# Patient Record
Sex: FEMALE | Race: WHITE | Employment: UNEMPLOYED | URBAN - METROPOLITAN AREA
[De-identification: names, ages, dates, MRNs, and addresses within clinical notes are randomized per-mention and may not be internally consistent; named-entity substitution may affect disease eponyms.]

---

## 2018-09-04 ENCOUNTER — OFFICE VISIT (OUTPATIENT)
Dept: FAMILY MEDICINE CLINIC | Age: 63
End: 2018-09-04
Payer: COMMERCIAL

## 2018-09-04 VITALS
TEMPERATURE: 99.1 F | HEIGHT: 62 IN | HEART RATE: 77 BPM | WEIGHT: 138 LBS | BODY MASS INDEX: 25.4 KG/M2 | SYSTOLIC BLOOD PRESSURE: 122 MMHG | OXYGEN SATURATION: 97 % | RESPIRATION RATE: 16 BRPM | DIASTOLIC BLOOD PRESSURE: 82 MMHG
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
## 2018-09-04 PROCEDURE — 4004F PT TOBACCO SCREEN RCVD TLK: CPT | Performed by: PHYSICIAN ASSISTANT

## 2018-09-04 PROCEDURE — 90471 IMMUNIZATION ADMIN: CPT | Performed by: PHYSICIAN ASSISTANT

## 2018-09-04 PROCEDURE — G8419 CALC BMI OUT NRM PARAM NOF/U: HCPCS | Performed by: PHYSICIAN ASSISTANT

## 2018-09-04 PROCEDURE — 3017F COLORECTAL CA SCREEN DOC REV: CPT | Performed by: PHYSICIAN ASSISTANT

## 2018-09-04 PROCEDURE — 90715 TDAP VACCINE 7 YRS/> IM: CPT | Performed by: PHYSICIAN ASSISTANT

## 2018-09-04 PROCEDURE — G8427 DOCREV CUR MEDS BY ELIG CLIN: HCPCS | Performed by: PHYSICIAN ASSISTANT

## 2018-09-04 PROCEDURE — 99213 OFFICE O/P EST LOW 20 MIN: CPT | Performed by: PHYSICIAN ASSISTANT

## 2018-09-04 RX ORDER — FLUCONAZOLE 150 MG/1
150 TABLET ORAL ONCE
Qty: 1 TABLET | Refills: 0 | Status: SHIPPED | OUTPATIENT
Start: 2018-09-04 | End: 2018-09-04

## 2018-09-04 RX ORDER — CEPHALEXIN 500 MG/1
500 CAPSULE ORAL 4 TIMES DAILY
Qty: 40 CAPSULE | Refills: 0 | Status: SHIPPED | OUTPATIENT
Start: 2018-09-04 | End: 2018-09-14

## 2018-09-04 RX ORDER — AZITHROMYCIN 250 MG/1
TABLET, FILM COATED ORAL
Qty: 1 PACKET | Refills: 0 | Status: SHIPPED | OUTPATIENT
Start: 2018-09-04 | End: 2018-09-08

## 2018-09-04 NOTE — PROGRESS NOTES
interpreted by Radiologist unless otherwise noted. -------------------------------------Impression & Disposition Section-----------------------------------  Impression(s):  1. Cat scratch      Disposition:  Disposition: Discharge to home      Patient's tetanus shot was updated. I will place her on a Zithromax (cat scratch)and Keflex. I advised if she has any worsening symptoms she needs to be reevaluated immediately. She is in agreement with this plan. No coinciding abscess present today. Pt advised to f/u with PCP in 5-7 days for continued management and further care. ER if changes or worse. Pt advised to take all medications as directed.

## 2018-09-14 ENCOUNTER — OFFICE VISIT (OUTPATIENT)
Dept: FAMILY MEDICINE CLINIC | Age: 63
End: 2018-09-14
Payer: COMMERCIAL

## 2018-09-14 VITALS
WEIGHT: 124 LBS | HEIGHT: 62 IN | HEART RATE: 76 BPM | OXYGEN SATURATION: 96 % | BODY MASS INDEX: 22.82 KG/M2 | SYSTOLIC BLOOD PRESSURE: 188 MMHG | DIASTOLIC BLOOD PRESSURE: 92 MMHG | TEMPERATURE: 98.9 F

## 2018-09-14 DIAGNOSIS — I10 ESSENTIAL HYPERTENSION: ICD-10-CM

## 2018-09-14 PROCEDURE — 4004F PT TOBACCO SCREEN RCVD TLK: CPT | Performed by: STUDENT IN AN ORGANIZED HEALTH CARE EDUCATION/TRAINING PROGRAM

## 2018-09-14 PROCEDURE — G8427 DOCREV CUR MEDS BY ELIG CLIN: HCPCS | Performed by: STUDENT IN AN ORGANIZED HEALTH CARE EDUCATION/TRAINING PROGRAM

## 2018-09-14 PROCEDURE — 99202 OFFICE O/P NEW SF 15 MIN: CPT | Performed by: STUDENT IN AN ORGANIZED HEALTH CARE EDUCATION/TRAINING PROGRAM

## 2018-09-14 PROCEDURE — 3017F COLORECTAL CA SCREEN DOC REV: CPT | Performed by: STUDENT IN AN ORGANIZED HEALTH CARE EDUCATION/TRAINING PROGRAM

## 2018-09-14 PROCEDURE — G0444 DEPRESSION SCREEN ANNUAL: HCPCS | Performed by: STUDENT IN AN ORGANIZED HEALTH CARE EDUCATION/TRAINING PROGRAM

## 2018-09-14 PROCEDURE — G8420 CALC BMI NORM PARAMETERS: HCPCS | Performed by: STUDENT IN AN ORGANIZED HEALTH CARE EDUCATION/TRAINING PROGRAM

## 2018-09-14 PROCEDURE — 99213 OFFICE O/P EST LOW 20 MIN: CPT | Performed by: STUDENT IN AN ORGANIZED HEALTH CARE EDUCATION/TRAINING PROGRAM

## 2018-09-14 RX ORDER — ESOMEPRAZOLE MAGNESIUM 40 MG/1
40 FOR SUSPENSION ORAL DAILY
COMMUNITY
End: 2019-04-16 | Stop reason: ALTCHOICE

## 2018-09-14 RX ORDER — AZITHROMYCIN 250 MG/1
250 TABLET, FILM COATED ORAL DAILY
COMMUNITY
End: 2019-04-16 | Stop reason: ALTCHOICE

## 2018-09-14 RX ORDER — VENLAFAXINE 37.5 MG/1
37.5 TABLET ORAL 2 TIMES DAILY
COMMUNITY
End: 2019-04-16 | Stop reason: ALTCHOICE

## 2018-09-14 RX ORDER — NADOLOL 20 MG/1
20 TABLET ORAL NIGHTLY
COMMUNITY
End: 2019-04-16 | Stop reason: ALTCHOICE

## 2018-09-14 RX ORDER — MECLIZINE HYDROCHLORIDE 25 MG/1
25 TABLET ORAL 2 TIMES DAILY
COMMUNITY
End: 2019-04-16 | Stop reason: ALTCHOICE

## 2018-09-14 RX ORDER — HYDROCORTISONE ACETATE 25 MG/1
25 SUPPOSITORY RECTAL 2 TIMES DAILY
COMMUNITY
End: 2019-04-16 | Stop reason: ALTCHOICE

## 2018-09-14 RX ORDER — FLUOXETINE HYDROCHLORIDE 40 MG/1
40 CAPSULE ORAL DAILY
COMMUNITY
End: 2019-04-16 | Stop reason: ALTCHOICE

## 2018-09-14 ASSESSMENT — ENCOUNTER SYMPTOMS
BLURRED VISION: 0
COUGH: 1
NAUSEA: 1
WHEEZING: 1
ORTHOPNEA: 0
SPUTUM PRODUCTION: 1
HEARTBURN: 1
DIARRHEA: 1
VOMITING: 0
SHORTNESS OF BREATH: 1
HEMOPTYSIS: 0
CONSTIPATION: 1

## 2018-09-14 ASSESSMENT — PATIENT HEALTH QUESTIONNAIRE - PHQ9
8. MOVING OR SPEAKING SO SLOWLY THAT OTHER PEOPLE COULD HAVE NOTICED. OR THE OPPOSITE, BEING SO FIGETY OR RESTLESS THAT YOU HAVE BEEN MOVING AROUND A LOT MORE THAN USUAL: 2
SUM OF ALL RESPONSES TO PHQ QUESTIONS 1-9: 23
3. TROUBLE FALLING OR STAYING ASLEEP: 3
10. IF YOU CHECKED OFF ANY PROBLEMS, HOW DIFFICULT HAVE THESE PROBLEMS MADE IT FOR YOU TO DO YOUR WORK, TAKE CARE OF THINGS AT HOME, OR GET ALONG WITH OTHER PEOPLE: 2
9. THOUGHTS THAT YOU WOULD BE BETTER OFF DEAD, OR OF HURTING YOURSELF: 2
SUM OF ALL RESPONSES TO PHQ9 QUESTIONS 1 & 2: 6
4. FEELING TIRED OR HAVING LITTLE ENERGY: 3
1. LITTLE INTEREST OR PLEASURE IN DOING THINGS: 3
7. TROUBLE CONCENTRATING ON THINGS, SUCH AS READING THE NEWSPAPER OR WATCHING TELEVISION: 1
2. FEELING DOWN, DEPRESSED OR HOPELESS: 3
SUM OF ALL RESPONSES TO PHQ QUESTIONS 1-9: 23
6. FEELING BAD ABOUT YOURSELF - OR THAT YOU ARE A FAILURE OR HAVE LET YOURSELF OR YOUR FAMILY DOWN: 3
5. POOR APPETITE OR OVEREATING: 3

## 2018-09-14 NOTE — PROGRESS NOTES
Hypertension. Past Surgical history :    Past Surgical History:   Procedure Laterality Date    APPENDECTOMY      BUNIONECTOMY      COLON SURGERY      HAMMER TOE SURGERY      HAND SURGERY      HYSTERECTOMY         Family Medical History :   Family History   Problem Relation Age of Onset    Diabetes Mother     Heart Disease Mother     Heart Attack Mother     Diabetes Maternal Grandmother        Social History :   Social History     Social History    Marital status:      Spouse name: N/A    Number of children: N/A    Years of education: N/A     Occupational History    Not on file.      Social History Main Topics    Smoking status: Current Every Day Smoker     Packs/day: 0.50     Years: 30.00     Types: Cigarettes    Smokeless tobacco: Never Used    Alcohol use Yes      Comment: BV and coke:  1 bottle in a month    Drug use: Yes     Frequency: 7.0 times per week     Types: Marijuana    Sexual activity: Yes     Partners: Male     Other Topics Concern    Not on file     Social History Narrative    No narrative on file        Current Medications    Current Outpatient Prescriptions   Medication Sig Dispense Refill    meclizine (ANTIVERT) 25 MG tablet Take 25 mg by mouth 2 times daily      nadolol (CORGARD) 20 MG tablet Take 20 mg by mouth nightly      venlafaxine (EFFEXOR) 37.5 MG tablet Take 37.5 mg by mouth 2 times daily      FLUoxetine (PROZAC) 40 MG capsule Take 40 mg by mouth daily      esomeprazole Magnesium (NEXIUM) 40 MG PACK Take 40 mg by mouth daily      calcium carbonate-vitamin D3 (RA CALCIUM 600/VITAMIN D-3) 600-400 MG-UNIT TABS Take 600 tablets by mouth 2 times daily      Lactulose (CONSTULOSE PO) Take 10 mg by mouth      hydrocortisone (ANUCORT-HC) 25 MG suppository Place 25 mg rectally 2 times daily      azithromycin (ZITHROMAX) 250 MG tablet Take 250 mg by mouth daily      PROAIR  (90 Base) MCG/ACT inhaler       RESTASIS MULTIDOSE 0.05 % ophthalmic emulsion  cephALEXin (KEFLEX) 500 MG capsule Take 1 capsule by mouth 4 times daily for 10 days 40 capsule 0    amlodipine (NORVASC) 5 MG tablet Take 10 mg by mouth daily       valsartan (DIOVAN) 80 MG tablet Take 80 mg by mouth daily.  cyclobenzaprine (FLEXERIL) 10 MG tablet Take 10 mg by mouth 3 times daily as needed.  tramadol (ULTRAM) 50 MG tablet Take  by mouth every 6 hours as needed. Pt unsure of dosage        No current facility-administered medications for this visit. Allergies : No Known Allergies    Review of Systems :    Review of Systems   Eyes: Negative for blurred vision. Respiratory: Positive for cough, sputum production, shortness of breath and wheezing. Negative for hemoptysis. Cardiovascular: Positive for palpitations, claudication, leg swelling and PND. Negative for chest pain and orthopnea. Gastrointestinal: Positive for constipation, diarrhea, heartburn, melena and nausea. Negative for vomiting. Psychiatric/Behavioral: Positive for depression. Physical Exam :    VITAL SIGNS :   Vitals:    09/14/18 1000 09/14/18 1021   BP: (!) 170/84 (!) 188/92   Pulse: 76    Temp: 98.9 °F (37.2 °C)    TempSrc: Oral    SpO2: 96%    Weight: 124 lb (56.2 kg)    Height: 5' 2\" (1.575 m)        GENERAL APPEARANCE : NAD, cooperative, appears stated age  EYES : PERRL, EOM's intact, anicteric sclerae and no pallor  HENT : AT/NC, oropharynx clear and without exudates  NECK : Supple, no thyromegaly, no lymphadenopathy  LUNGS : Respiration unlabored, vesicular breath sounds in BL peripheral lungs with no wheezes, rhonchi or rales  HEART : RRR, normal S1/S2, no murmur noted  ABDOMEN : Normoactive bowel sounds, soft, non-tender, no masses  EXTREMITIES : No peripheral edema, no clubbing, no cyanosis, peripheral pulses +2 in lower extremities  SKIN : Warm and dry, healing abrasion with scab present on right hand on the dorsum between second and first digit.  Multiple tattoo on both upper resolve       RTO in 2 weeks or earlier if any complaints. Future Appointments  Date Time Provider Ally Katy   9/28/2018 9:00 AM Glee Cogan, MD [facility]     ----------------------------------------------------------------  Signed by :  Glee Cogan, M.D., PGY-1  This case was discussed with Dr. Dominic Anderson

## 2018-09-28 ENCOUNTER — OFFICE VISIT (OUTPATIENT)
Dept: FAMILY MEDICINE CLINIC | Age: 63
End: 2018-09-28
Payer: COMMERCIAL

## 2018-09-28 VITALS
HEIGHT: 62 IN | TEMPERATURE: 98.5 F | HEART RATE: 70 BPM | DIASTOLIC BLOOD PRESSURE: 95 MMHG | OXYGEN SATURATION: 99 % | SYSTOLIC BLOOD PRESSURE: 165 MMHG | BODY MASS INDEX: 23 KG/M2 | RESPIRATION RATE: 20 BRPM | WEIGHT: 125 LBS

## 2018-09-28 DIAGNOSIS — M81.8 OTHER OSTEOPOROSIS WITHOUT CURRENT PATHOLOGICAL FRACTURE: ICD-10-CM

## 2018-09-28 DIAGNOSIS — Z13.820 SCREENING FOR OSTEOPOROSIS: Primary | ICD-10-CM

## 2018-09-28 DIAGNOSIS — Z12.31 BREAST CANCER SCREENING BY MAMMOGRAM: ICD-10-CM

## 2018-09-28 PROCEDURE — G8427 DOCREV CUR MEDS BY ELIG CLIN: HCPCS | Performed by: STUDENT IN AN ORGANIZED HEALTH CARE EDUCATION/TRAINING PROGRAM

## 2018-09-28 PROCEDURE — G0009 ADMIN PNEUMOCOCCAL VACCINE: HCPCS

## 2018-09-28 PROCEDURE — 4004F PT TOBACCO SCREEN RCVD TLK: CPT | Performed by: STUDENT IN AN ORGANIZED HEALTH CARE EDUCATION/TRAINING PROGRAM

## 2018-09-28 PROCEDURE — G8420 CALC BMI NORM PARAMETERS: HCPCS | Performed by: STUDENT IN AN ORGANIZED HEALTH CARE EDUCATION/TRAINING PROGRAM

## 2018-09-28 PROCEDURE — 90732 PPSV23 VACC 2 YRS+ SUBQ/IM: CPT

## 2018-09-28 PROCEDURE — 99213 OFFICE O/P EST LOW 20 MIN: CPT | Performed by: STUDENT IN AN ORGANIZED HEALTH CARE EDUCATION/TRAINING PROGRAM

## 2018-09-28 PROCEDURE — 3017F COLORECTAL CA SCREEN DOC REV: CPT | Performed by: STUDENT IN AN ORGANIZED HEALTH CARE EDUCATION/TRAINING PROGRAM

## 2018-09-28 PROCEDURE — 90686 IIV4 VACC NO PRSV 0.5 ML IM: CPT

## 2018-09-28 PROCEDURE — G0008 ADMIN INFLUENZA VIRUS VAC: HCPCS

## 2018-09-28 PROCEDURE — 6360000002 HC RX W HCPCS

## 2018-09-28 PROCEDURE — 99212 OFFICE O/P EST SF 10 MIN: CPT | Performed by: STUDENT IN AN ORGANIZED HEALTH CARE EDUCATION/TRAINING PROGRAM

## 2018-09-28 ASSESSMENT — ENCOUNTER SYMPTOMS
SINUS PAIN: 1
SPUTUM PRODUCTION: 1
SORE THROAT: 0
HEMOPTYSIS: 0
COUGH: 1

## 2018-09-28 NOTE — PROGRESS NOTES
Vaccine Information Sheet, \"Influenza - Inactivated\"  given to Alisson Hughes, or parent/legal guardian of  Alisson Hughes and verbalized understanding. Patient responses:    Have you ever had a reaction to a flu vaccine? No  Are you able to eat eggs without adverse effects? Yes  Do you have any current illness? No  Have you ever had [place] Syndrome? No    Flu vaccine given per order. Please see immunization tab.

## 2018-10-05 ENCOUNTER — HOSPITAL ENCOUNTER (OUTPATIENT)
Dept: GENERAL RADIOLOGY | Age: 63
Discharge: HOME | End: 2018-10-07
Payer: COMMERCIAL

## 2018-10-05 DIAGNOSIS — Z13.820 SCREENING FOR OSTEOPOROSIS: ICD-10-CM

## 2018-10-05 DIAGNOSIS — Z12.31 BREAST CANCER SCREENING BY MAMMOGRAM: ICD-10-CM

## 2018-10-05 DIAGNOSIS — M81.8 OTHER OSTEOPOROSIS WITHOUT CURRENT PATHOLOGICAL FRACTURE: ICD-10-CM

## 2018-10-05 PROCEDURE — 77080 DXA BONE DENSITY AXIAL: CPT

## 2018-10-05 PROCEDURE — 77063 BREAST TOMOSYNTHESIS BI: CPT

## 2018-11-12 RX ORDER — VALSARTAN 80 MG/1
80 TABLET ORAL DAILY
Qty: 30 TABLET | Refills: 1 | Status: SHIPPED | OUTPATIENT
Start: 2018-11-12 | End: 2019-10-18 | Stop reason: SDUPTHER

## 2018-11-12 RX ORDER — AMLODIPINE BESYLATE 5 MG/1
10 TABLET ORAL DAILY
Qty: 60 TABLET | Refills: 1 | Status: SHIPPED | OUTPATIENT
Start: 2018-11-12 | End: 2019-04-16 | Stop reason: ALTCHOICE

## 2019-04-16 ENCOUNTER — APPOINTMENT (OUTPATIENT)
Dept: GENERAL RADIOLOGY | Age: 64
End: 2019-04-16
Payer: COMMERCIAL

## 2019-04-16 ENCOUNTER — HOSPITAL ENCOUNTER (OUTPATIENT)
Age: 64
Setting detail: OBSERVATION
Discharge: HOME | End: 2019-04-17
Attending: EMERGENCY MEDICINE | Admitting: FAMILY MEDICINE
Payer: COMMERCIAL

## 2019-04-16 ENCOUNTER — OFFICE VISIT (OUTPATIENT)
Dept: FAMILY MEDICINE CLINIC | Age: 64
End: 2019-04-16
Payer: COMMERCIAL

## 2019-04-16 VITALS
HEART RATE: 80 BPM | RESPIRATION RATE: 18 BRPM | BODY MASS INDEX: 22.82 KG/M2 | OXYGEN SATURATION: 93 % | WEIGHT: 124 LBS | TEMPERATURE: 98.2 F | HEIGHT: 62 IN | DIASTOLIC BLOOD PRESSURE: 84 MMHG | SYSTOLIC BLOOD PRESSURE: 129 MMHG

## 2019-04-16 DIAGNOSIS — R07.9 CHEST PAIN, UNSPECIFIED TYPE: Primary | ICD-10-CM

## 2019-04-16 PROBLEM — F31.9 BIPOLAR 1 DISORDER (HCC): Status: ACTIVE | Noted: 2019-04-16

## 2019-04-16 PROBLEM — K21.9 GERD (GASTROESOPHAGEAL REFLUX DISEASE): Status: ACTIVE | Noted: 2019-04-16

## 2019-04-16 LAB
ANION GAP SERPL CALCULATED.3IONS-SCNC: 16 MMOL/L (ref 7–16)
BASOPHILS ABSOLUTE: 0.05 E9/L (ref 0–0.2)
BASOPHILS RELATIVE PERCENT: 0.5 % (ref 0–2)
BUN BLDV-MCNC: 12 MG/DL (ref 8–23)
CALCIUM SERPL-MCNC: 9.1 MG/DL (ref 8.6–10.2)
CHLORIDE BLD-SCNC: 105 MMOL/L (ref 98–107)
CO2: 23 MMOL/L (ref 22–29)
CREAT SERPL-MCNC: 0.8 MG/DL (ref 0.5–1)
EOSINOPHILS ABSOLUTE: 0.08 E9/L (ref 0.05–0.5)
EOSINOPHILS RELATIVE PERCENT: 0.9 % (ref 0–6)
GFR AFRICAN AMERICAN: >60
GFR NON-AFRICAN AMERICAN: >60 ML/MIN/1.73
GLUCOSE BLD-MCNC: 103 MG/DL (ref 74–99)
HCT VFR BLD CALC: 40.8 % (ref 34–48)
HEMOGLOBIN: 14 G/DL (ref 11.5–15.5)
IMMATURE GRANULOCYTES #: 0.02 E9/L
IMMATURE GRANULOCYTES %: 0.2 % (ref 0–5)
LYMPHOCYTES ABSOLUTE: 2.6 E9/L (ref 1.5–4)
LYMPHOCYTES RELATIVE PERCENT: 28.3 % (ref 20–42)
MCH RBC QN AUTO: 30.4 PG (ref 26–35)
MCHC RBC AUTO-ENTMCNC: 34.3 % (ref 32–34.5)
MCV RBC AUTO: 88.7 FL (ref 80–99.9)
MONOCYTES ABSOLUTE: 0.59 E9/L (ref 0.1–0.95)
MONOCYTES RELATIVE PERCENT: 6.4 % (ref 2–12)
NEUTROPHILS ABSOLUTE: 5.86 E9/L (ref 1.8–7.3)
NEUTROPHILS RELATIVE PERCENT: 63.7 % (ref 43–80)
PDW BLD-RTO: 12 FL (ref 11.5–15)
PLATELET # BLD: 339 E9/L (ref 130–450)
PMV BLD AUTO: 9.4 FL (ref 7–12)
POTASSIUM SERPL-SCNC: 3.4 MMOL/L (ref 3.5–5)
PRO-BNP: 239 PG/ML (ref 0–125)
PRO-BNP: 253 PG/ML (ref 0–125)
RBC # BLD: 4.6 E12/L (ref 3.5–5.5)
SODIUM BLD-SCNC: 144 MMOL/L (ref 132–146)
TROPONIN: <0.01 NG/ML (ref 0–0.03)
WBC # BLD: 9.2 E9/L (ref 4.5–11.5)

## 2019-04-16 PROCEDURE — 99285 EMERGENCY DEPT VISIT HI MDM: CPT

## 2019-04-16 PROCEDURE — 36415 COLL VENOUS BLD VENIPUNCTURE: CPT

## 2019-04-16 PROCEDURE — G0378 HOSPITAL OBSERVATION PER HR: HCPCS

## 2019-04-16 PROCEDURE — 93010 ELECTROCARDIOGRAM REPORT: CPT | Performed by: STUDENT IN AN ORGANIZED HEALTH CARE EDUCATION/TRAINING PROGRAM

## 2019-04-16 PROCEDURE — 84484 ASSAY OF TROPONIN QUANT: CPT

## 2019-04-16 PROCEDURE — 6370000000 HC RX 637 (ALT 250 FOR IP)

## 2019-04-16 PROCEDURE — 2580000003 HC RX 258: Performed by: STUDENT IN AN ORGANIZED HEALTH CARE EDUCATION/TRAINING PROGRAM

## 2019-04-16 PROCEDURE — 3017F COLORECTAL CA SCREEN DOC REV: CPT | Performed by: STUDENT IN AN ORGANIZED HEALTH CARE EDUCATION/TRAINING PROGRAM

## 2019-04-16 PROCEDURE — G8420 CALC BMI NORM PARAMETERS: HCPCS | Performed by: STUDENT IN AN ORGANIZED HEALTH CARE EDUCATION/TRAINING PROGRAM

## 2019-04-16 PROCEDURE — 6370000000 HC RX 637 (ALT 250 FOR IP): Performed by: STUDENT IN AN ORGANIZED HEALTH CARE EDUCATION/TRAINING PROGRAM

## 2019-04-16 PROCEDURE — G8427 DOCREV CUR MEDS BY ELIG CLIN: HCPCS | Performed by: STUDENT IN AN ORGANIZED HEALTH CARE EDUCATION/TRAINING PROGRAM

## 2019-04-16 PROCEDURE — 94761 N-INVAS EAR/PLS OXIMETRY MLT: CPT

## 2019-04-16 PROCEDURE — 6370000000 HC RX 637 (ALT 250 FOR IP): Performed by: EMERGENCY MEDICINE

## 2019-04-16 PROCEDURE — 71045 X-RAY EXAM CHEST 1 VIEW: CPT

## 2019-04-16 PROCEDURE — 93005 ELECTROCARDIOGRAM TRACING: CPT | Performed by: STUDENT IN AN ORGANIZED HEALTH CARE EDUCATION/TRAINING PROGRAM

## 2019-04-16 PROCEDURE — 4004F PT TOBACCO SCREEN RCVD TLK: CPT | Performed by: STUDENT IN AN ORGANIZED HEALTH CARE EDUCATION/TRAINING PROGRAM

## 2019-04-16 PROCEDURE — 83880 ASSAY OF NATRIURETIC PEPTIDE: CPT

## 2019-04-16 PROCEDURE — 80048 BASIC METABOLIC PNL TOTAL CA: CPT

## 2019-04-16 PROCEDURE — 85025 COMPLETE CBC W/AUTO DIFF WBC: CPT

## 2019-04-16 PROCEDURE — 99214 OFFICE O/P EST MOD 30 MIN: CPT | Performed by: STUDENT IN AN ORGANIZED HEALTH CARE EDUCATION/TRAINING PROGRAM

## 2019-04-16 PROCEDURE — 99213 OFFICE O/P EST LOW 20 MIN: CPT | Performed by: STUDENT IN AN ORGANIZED HEALTH CARE EDUCATION/TRAINING PROGRAM

## 2019-04-16 RX ORDER — ESCITALOPRAM OXALATE 10 MG/1
10 TABLET ORAL DAILY
COMMUNITY
End: 2019-10-18 | Stop reason: SDUPTHER

## 2019-04-16 RX ORDER — NITROGLYCERIN 0.4 MG/1
0.4 TABLET SUBLINGUAL EVERY 5 MIN PRN
Status: DISCONTINUED | OUTPATIENT
Start: 2019-04-16 | End: 2019-04-17 | Stop reason: HOSPADM

## 2019-04-16 RX ORDER — ASPIRIN 81 MG/1
162 TABLET, CHEWABLE ORAL ONCE
Status: COMPLETED | OUTPATIENT
Start: 2019-04-16 | End: 2019-04-16

## 2019-04-16 RX ORDER — ASPIRIN 81 MG/1
324 TABLET, CHEWABLE ORAL ONCE
Status: COMPLETED | OUTPATIENT
Start: 2019-04-16 | End: 2019-04-16

## 2019-04-16 RX ORDER — SODIUM CHLORIDE 0.9 % (FLUSH) 0.9 %
10 SYRINGE (ML) INJECTION EVERY 12 HOURS SCHEDULED
Status: DISCONTINUED | OUTPATIENT
Start: 2019-04-16 | End: 2019-04-17 | Stop reason: HOSPADM

## 2019-04-16 RX ORDER — SODIUM CHLORIDE 0.9 % (FLUSH) 0.9 %
10 SYRINGE (ML) INJECTION PRN
Status: DISCONTINUED | OUTPATIENT
Start: 2019-04-16 | End: 2019-04-17 | Stop reason: HOSPADM

## 2019-04-16 RX ORDER — PANTOPRAZOLE SODIUM 40 MG/1
40 TABLET, DELAYED RELEASE ORAL
Status: DISCONTINUED | OUTPATIENT
Start: 2019-04-17 | End: 2019-04-16 | Stop reason: SDUPTHER

## 2019-04-16 RX ORDER — AMLODIPINE BESYLATE 10 MG/1
10 TABLET ORAL DAILY
COMMUNITY
End: 2019-10-18 | Stop reason: SDUPTHER

## 2019-04-16 RX ORDER — ASPIRIN 81 MG/1
81 TABLET, CHEWABLE ORAL DAILY
Status: DISCONTINUED | OUTPATIENT
Start: 2019-04-17 | End: 2019-04-17 | Stop reason: HOSPADM

## 2019-04-16 RX ORDER — CYCLOBENZAPRINE HCL 10 MG
10 TABLET ORAL 3 TIMES DAILY PRN
Status: DISCONTINUED | OUTPATIENT
Start: 2019-04-16 | End: 2019-04-17 | Stop reason: HOSPADM

## 2019-04-16 RX ORDER — VALSARTAN 80 MG/1
80 TABLET ORAL DAILY
Status: DISCONTINUED | OUTPATIENT
Start: 2019-04-16 | End: 2019-04-17 | Stop reason: HOSPADM

## 2019-04-16 RX ORDER — AMLODIPINE BESYLATE 10 MG/1
10 TABLET ORAL DAILY
Status: DISCONTINUED | OUTPATIENT
Start: 2019-04-16 | End: 2019-04-17 | Stop reason: HOSPADM

## 2019-04-16 RX ORDER — ESOMEPRAZOLE MAGNESIUM 40 MG/1
40 FOR SUSPENSION ORAL DAILY
Status: DISCONTINUED | OUTPATIENT
Start: 2019-04-16 | End: 2019-04-16 | Stop reason: CLARIF

## 2019-04-16 RX ORDER — ONDANSETRON 2 MG/ML
4 INJECTION INTRAMUSCULAR; INTRAVENOUS EVERY 6 HOURS PRN
Status: DISCONTINUED | OUTPATIENT
Start: 2019-04-16 | End: 2019-04-17 | Stop reason: HOSPADM

## 2019-04-16 RX ORDER — PANTOPRAZOLE SODIUM 40 MG/1
40 TABLET, DELAYED RELEASE ORAL
Status: DISCONTINUED | OUTPATIENT
Start: 2019-04-17 | End: 2019-04-17 | Stop reason: HOSPADM

## 2019-04-16 RX ORDER — LANOLIN ALCOHOL/MO/W.PET/CERES
3 CREAM (GRAM) TOPICAL NIGHTLY PRN
COMMUNITY
End: 2019-06-27

## 2019-04-16 RX ORDER — ATORVASTATIN CALCIUM 10 MG/1
10 TABLET, FILM COATED ORAL NIGHTLY
Status: DISCONTINUED | OUTPATIENT
Start: 2019-04-16 | End: 2019-04-17

## 2019-04-16 RX ORDER — POTASSIUM CHLORIDE 20 MEQ/1
40 TABLET, EXTENDED RELEASE ORAL ONCE
Status: COMPLETED | OUTPATIENT
Start: 2019-04-16 | End: 2019-04-16

## 2019-04-16 RX ORDER — ESCITALOPRAM OXALATE 10 MG/1
10 TABLET ORAL DAILY
Status: DISCONTINUED | OUTPATIENT
Start: 2019-04-16 | End: 2019-04-17 | Stop reason: HOSPADM

## 2019-04-16 RX ADMIN — ASPIRIN 162 MG: 81 TABLET, CHEWABLE ORAL at 15:08

## 2019-04-16 RX ADMIN — ASPIRIN 81 MG 324 MG: 81 TABLET ORAL at 16:15

## 2019-04-16 RX ADMIN — ATORVASTATIN CALCIUM 10 MG: 10 TABLET, FILM COATED ORAL at 21:23

## 2019-04-16 RX ADMIN — POTASSIUM CHLORIDE 40 MEQ: 20 TABLET, EXTENDED RELEASE ORAL at 17:41

## 2019-04-16 RX ADMIN — Medication 10 ML: at 21:24

## 2019-04-16 ASSESSMENT — PAIN SCALES - GENERAL
PAINLEVEL_OUTOF10: 2
PAINLEVEL_OUTOF10: 0
PAINLEVEL_OUTOF10: 0

## 2019-04-16 ASSESSMENT — ENCOUNTER SYMPTOMS
VOICE CHANGE: 0
VOMITING: 0
ABDOMINAL DISTENTION: 0
WHEEZING: 0
SHORTNESS OF BREATH: 1
WHEEZING: 0
NAUSEA: 0
SHORTNESS OF BREATH: 1
CONSTIPATION: 0
PHOTOPHOBIA: 0
CHEST TIGHTNESS: 1
CONSTIPATION: 0
CHOKING: 0
EYE PAIN: 0
ABDOMINAL PAIN: 0
RHINORRHEA: 0
COUGH: 0
SORE THROAT: 0
APNEA: 0
RHINORRHEA: 0
COUGH: 1
TROUBLE SWALLOWING: 0
BLOOD IN STOOL: 0
CHEST TIGHTNESS: 1
NAUSEA: 0
BLOOD IN STOOL: 0
EYE REDNESS: 0
ABDOMINAL DISTENTION: 0
VOMITING: 0
EYE DISCHARGE: 0
SINUS PRESSURE: 0
BACK PAIN: 0
SORE THROAT: 0
ABDOMINAL PAIN: 0
BACK PAIN: 0
DIARRHEA: 0

## 2019-04-16 ASSESSMENT — PAIN DESCRIPTION - PAIN TYPE: TYPE: ACUTE PAIN

## 2019-04-16 ASSESSMENT — PAIN DESCRIPTION - DESCRIPTORS: DESCRIPTORS: SQUEEZING

## 2019-04-16 ASSESSMENT — PAIN DESCRIPTION - LOCATION: LOCATION: CHEST

## 2019-04-16 NOTE — PROGRESS NOTES
[facility]  FAMILY MEDICINE RESIDENCY PROGRAM   OFFICE PROGRESS NOTE  DATE OF VISIT : 2019    Patient : Joleen Jordan   Sex : femaleAge : 61 y.o.  : 1955   MRN : [id number]              Chief Complaint :   Chief Complaint   Patient presents with    Follow-Up from Hospital     heart surgery wants a referral patient signed herself out of the [facility] [address]  61 y.o. who presented to the clinic today for chest pressure in the middle of chest since last 3 weeks she and  has had intermittent left sided shoulder pain association with SOB and lightheadedness. She states her symptoms are present at rest and with exertion as well, states her shortness of breath is at baseline. Endorses heart burn and cough at baseline. Denies nausea, vomiting, diaphoresis, numbness, tingling, speech disturbance or imbalance. She smokes about a pack per day and her family history is significant for MI in her mother at the age of 62    She states she was admitted at [facility] the  for troponin level of 0.45 and was scheduled for a cardiac catheterization because she \"was not a candidate\" for stress. She left against medical advice as the catheterization was postponed over the weekend for the upcoming Monday 3/29/19. Since then her symptoms have been constant and increasing in severity progressively.        Past Medical History :  has a past medical history of Arthritis, Bipolar 1 disorder ([address] Cirrhosis of liver due to hepatitis C, Depression, and Hypertension.     Past Surgical history :    Past Surgical History:   Procedure Laterality Date    APPENDECTOMY      BUNIONECTOMY      COLON SURGERY      HAMMER TOE SURGERY      HAND SURGERY      HYSTERECTOMY         Family Medical History :   Family History   Problem Relation Age of Onset    Diabetes Mother     Heart Disease Mother     Heart Attack Mother     Diabetes Maternal Grandmother        Social History : Social History     Socioeconomic History    Marital status:      Spouse name: Not on file    Number of children: Not on file    Years of education: Not on file    Highest education level: Not on file   Occupational History    Not on file   Social Needs    Financial resource strain: Not on file    Food insecurity:     Worry: Not on file     Inability: Not on file    Transportation needs:     Medical: Not on file     Non-medical: Not on file   Tobacco Use    Smoking status: Current Every Day Smoker     Packs/day: 0.50     Years: 30.00     Pack years: 15.00     Types: Cigarettes    Smokeless tobacco: Never Used   Substance and Sexual Activity    Alcohol use: Not Currently     Comment: BV and coke:  1 bottle in a month    Drug use: Yes     Frequency: 7.0 times per week     Types: Marijuana    Sexual activity: Yes     Partners: Male   Lifestyle    Physical activity:     Days per week: Not on file     Minutes per session: Not on file    Stress: Not on file   Relationships    Social connections:     Talks on phone: Not on file     Gets together: Not on file     Attends [employer] service: Not on file     Active member of club or organization: Not on file     Attends meetings of clubs or organizations: Not on file     Relationship status: Not on file    Intimate partner violence:     Fear of current or ex partner: Not on file     Emotionally abused: Not on file     Physically abused: Not on file     Forced sexual activity: Not on file   Other Topics Concern    Not on file   Social History Narrative    Not on file        Current Medications    No current facility-administered medications for this visit. No current outpatient medications on file.      Facility-Administered Medications Ordered in Other Visits   Medication Dose Route Frequency Provider Last Rate Last Dose    nitroGLYCERIN (NITROSTAT) SL tablet 0.4 mg  0.4 mg Sublingual Q5 Min PRN Rosezetta Manan, DO        amLODIPine (NORVASC) tablet 10 mg  10 mg Oral Daily Lucero De Anda MD        cyclobenzaprine (FLEXERIL) tablet 10 mg  10 mg Oral TID PRN Lucero De Anda MD        valsartan (DIOVAN) tablet 80 mg  80 mg Oral Daily Lucero De Anda MD        escitalopram (LEXAPRO) tablet 10 mg  10 mg Oral Daily Lucero De Anda MD        sodium chloride flush 0.9 % injection 10 mL  10 mL Intravenous 2 times per day Lucero De Anda MD   10 mL at 04/16/19 2124    sodium chloride flush 0.9 % injection 10 mL  10 mL Intravenous PRN Lucero De Anda MD        magnesium hydroxide (MILK OF MAGNESIA) 400 MG/5ML suspension 30 mL  30 mL Oral Daily PRN Lucero De Anda MD        ondansetron [facility]) injection 4 mg  4 mg Intravenous Q6H PRN Lucero De Anda MD        enoxaparin (LOVENOX) injection 40 mg  40 mg Subcutaneous Daily Lucero De Anda MD        [START ON 4/17/2019] aspirin chewable tablet 81 mg  81 mg Oral Daily Lucero De Anda MD        atorvastatin (LIPITOR) tablet 10 mg  10 mg Oral Nightly Lucero De Anda MD   10 mg at 04/16/19 2123    [START ON 4/17/2019] metoprolol tartrate (LOPRESSOR) tablet 25 mg  25 mg Oral BID Lucero De Anda MD        [START ON 4/17/2019] pantoprazole (PROTONIX) tablet 40 mg  40 mg Oral QAM AC Lucero De Anda MD           Allergies : No Known Allergies    Review of Systems :    Review of Systems   Constitutional: Negative for activity change, appetite change, diaphoresis and fever. HENT: Negative for ear pain, rhinorrhea, sore throat and voice change. Eyes: Negative for discharge. Respiratory: Positive for cough, chest tightness and shortness of breath. Negative for apnea, choking and wheezing. Cardiovascular: Positive for chest pain. Negative for palpitations and leg swelling. Gastrointestinal: Negative for abdominal distention, abdominal pain, blood in stool, constipation, nausea and vomiting. Genitourinary: Negative for difficulty urinating and frequency. Musculoskeletal: Negative for arthralgias, back pain, gait problem and neck pain. Neurological: Positive for light-headedness. Psychiatric/Behavioral: Negative for self-injury. Physical Exam :    VITAL SIGNS :   Vitals:    04/16/19 1407   BP: 129/84   Site: Right Upper Arm   Position: Sitting   Cuff Size: Medium Adult   Pulse: 80   Resp: 18   Temp: 98.2 °F (36.8 °C)   TempSrc: Oral   SpO2: 93%   Weight: 124 lb (56.2 kg)   Height: 5' 2\" (1.575 m)       GENERAL APPEARANCE : Patient smell of smoke, sitting comfortably, does not appear to be in any stress, but anxious EYES : PERRL, EOM's intact, anictericsclerae  HENT : AT/NC, oropharynx clear and without exudates  NECK : Supple  LUNGS : Respiration unlabored, vesicular breath sounds in BL peripheral lungs with no wheezes, rhonchi or rales  HEART : RRR, normal S1/S2, no murmur noted, chest non tender to palpation   ABDOMEN : Normoactive bowel sounds,soft, non-tender, no masses  EXTREMITIES : No peripheral edema, peripheral pulses +2  SKIN : Warm and dry, no lesions orerythema  PSYCHIATRIC : Appropriate affect             Assessment & Plan :    Johnathan Ramirez was seen today for follow-up from hospital.    Diagnoses and all orders for this visit:    Chest pain/ Pressure, Atypical angina  Atypical symptoms   High risk for ACS  Age, postmenopausal, smoker, hypertension, COPD  Elevated troponin in March  EKG 12 Lead - changes concerning for NSTEMI, Q wave, Twave changes noted when compared with Old EKG  Aspirin chewable tablet 162 mg  Urgent transfer to ED with EMS, Proper communication done.     ----------------------------------------------------------------  Signed by :  Maite Ragsdale M.D., PGY-1  This case was discussed with Dr. Kenisha Yeager

## 2019-04-16 NOTE — H&P
[facility]  FAMILY MEDICINE RESIDENCY PROGRAM   INPATIENT HISTORY & PHYSICAL   DATE : 2019    Patient : Andreia Falk   Age : 61 y.o. Sex : female    : 1955   MRN : [id number]     Code Status : Prior   PCP : Cristina Collins MD         Chief Complaint :   Chief Complaint   Patient presents with    Chest Pain     2/10 squeezing pain received 81mg ASA PTA       History obtained from : patient    Present Illness : Andreia Falk comes to ED today for chest pain. Mrs. Magdi Thornton is 61year old female with a history of COPD non-compliant with inhalers, hepatitis C s/p treatment (history of IVDA), and Bipolar disorder. She was sent from clinic for reports of chest pain and abnormal EKG. Of note she states she was admitted at [facility] the  for troponin level of 0.45 and was scheduled for a cardiac catheterization because she \"was not a candidate\" for stress. She left against medical advice as the catheterization was postponed over the weekend for the upcoming Monday. She reports for the last three weeks she has had intermittent left sided substernal chest pain. Endorses radiation of the pain to her left shoulder and association with SOB and lightheadedness. Also endorsing visual changes and headaches for the last three weeks. Reports she is in need of change or her lens prescription. Endorses heart burn and cough. She is currently taking nexium which is not efficacious. Denies nausea, vomiting, diaphoresis, numbness, tingling, speech disturbance or imbalance. She smokes about a pack per day and her family history is significant for MI in her mother at the age of 62. ED Course : In the ED she remained hemodynamically stable and chest pain free. She was given sublingual nitro and aspirin 324. Thelaboratory data obtained by ED work up was significant for potassium of 3.4.       EKG: Rhythm Strip: normal sinus rhythm with T wave inversions in the lateral leads.    ED Actual orders :   Orders Placed This Encounter   Procedures    XR CHEST PORTABLE    CBC auto differential    Basic Metabolic Panel    Troponin    Brain Natriuretic Peptide    Pulse Oximetry    Telemetry monitoring    Inpatient consult to [facility]    EKG 12 Lead    Insert peripheral IV    PATIENT STATUS (FROM ED OR OR/PROCEDURAL) Observation       Past Medical History :  has a past medical history of Arthritis, Bipolar 1 disorder ([address] Cirrhosis of liver due to hepatitis C, Depression, and Hypertension. Past Surgical History :  has a past surgical history that includes Hysterectomy; Hand surgery; Bunionectomy; Hammer toe surgery; Appendectomy; and Colon surgery. Family History : family history includes Diabetes in her maternal grandmother and mother; Heart Attack in her mother; Heart Disease in her mother. Social History :  reports that she has been smoking cigarettes. She has a 15.00 pack-year smoking history. She has never used smokeless tobacco. She reports that she drank alcohol. She reports that she has current or past drug history. Drug: Marijuana. Frequency: 7.00 times per week. Allergies : No Known Allergies     Medications prior admission :   No current facility-administered medications on file prior to encounter.       Current Outpatient Medications on File Prior to Encounter   Medication Sig Dispense Refill    escitalopram (LEXAPRO) 10 MG tablet Take 10 mg by mouth daily      amLODIPine (NORVASC) 10 MG tablet Take 10 mg by mouth daily      melatonin 3 MG TABS tablet Take 3 mg by mouth nightly as needed      valsartan (DIOVAN) 80 MG tablet Take 1 tablet by mouth daily 30 tablet 1    PROAIR  (90 Base) MCG/ACT inhaler          SYSTEMS SYMPTOMS   Constitutional:  Eyes:  HENT:  Respiratory:  Cardiovascular:  Gastrointestinal:  Genitourinary:  Musculoskeletal:  Endocrine:  Hematology:  Neurological:  Psychiatric: No fever, no chills, no involuntary weight changes  + blurry vision, no visual changes, no photophobia  + headache, no hearing change, no vertigo  No cough, + shortness of breath, no wheezes  + chest pain, no palpitations, no edema  No N/V, no D/C, no abdominal pain, no bloody or black stools  No dysuria, no hematuria, no trouble voiding  No joint pain, no muscle pain  No heat/cold intolerance, no hair changes  No increased bleeding, no increased bruising  No tremors, no weakness, no  numbness/tingling  No depressed mood, no hallucinations       Physical Exam :    VITAL SIGNS :   Vitals:    04/16/19 1530 04/16/19 1616 04/16/19 1743 04/16/19 1744   BP: 138/85 (!) 150/85 (!) 143/81 (!) 143/81   Pulse: 71 76 73 73   Resp: 16 16 19 20   Temp: 97.4 °F (36.3 °C)  97.4 °F (36.3 °C)    TempSrc: Temporal  Oral    SpO2: 97% 95% 98% 97%   Weight: 120 lb (54.4 kg)      Height: 5' 2\" (1.575 m)           FINDINGS   Gen. Appearance:  Head/face:  Eyes:  Nose/Sinuses:  Mouth/Throat:  Neck:  Thorax:  Lungs:  Heart:  Abdomen:  Extremities:  Peripheral pulses:  Musculoskeletal:  Neurologic:        Psychiatric:   NAD, appears stated age, cooperative  Normocephalic and atraumatic  PERRL, EOM intacts and sclera nonicteric  Septum midline. Mucosa normal. No drainage  Pharynx without erythema, edema or exudate  Supple, no thyromegaly, no lymphadenopathy  Symmetric with good expansion  CTAB, normal air movement, respiration unlabored  RRR, normal S1/S2, no murmur, rub, gallop  Soft, ND/NT, BS+, no masses or HSM. No CVA tenderness  Warm to touch without edema and no clubbing  Radial pulse 2+ bilaterally, dorsalis pedis pulse 2+ bilaterally  Full ROM in all major joints.  No swelling or deformity  Strength 5/5 throughout, cranial nerves intact, normal sensation, patellar reflex +2 bilaterally, Romberg negative, Arnold intact, finger-to-nose intact, heel-to-shin intact  A&O to person, place and time, normal behavior, normal thought content, normal affect     Recent Labs :   Results for Issues :  Patient Active Problem List   Diagnosis    Depression, major, recurrent [facility]    Hypertension    Viral hepatitis B with hepatitis delta    [facility], [facility]    Chest pain    Bipolar [facility] disorder ([facility]    Cirrhosis of liver due to hepatitis C    GERD (gastroesophageal reflux disease)         ASSESSMENT & PLAN     Principal Problem:    Chest pain  Active Problems:    Hypertension    Bipolar 1 disorder (HCC)    Cirrhosis of liver due to hepatitis C    GERD (gastroesophageal reflux disease)  Resolved Problems:    * No resolved hospital problems. *    Atypical Chest pain with ACS low risk  Secondary to ACS vs GERD, asymptomatic at this time  Initial troponin: 0.01, Cycle trops   EKG shows: T wave inversions in lateral leads, Repeat EKG in the AM  Sublingual Nitro and Aspirin 324 given in the ED  CVD Risk 8 %, HEART score 4  Start of atorvastatin, metoprolol 25 BID  Continuation of valsartan  Oxygen with target sats >90%  Stress test in the AM, NPO after midnight  Obtain Medical record release from [address] [place] [address] to [facility]    Hypertension  Restart home medications valsartan and amlodipine.      GERD  GERD dietary modifications  Protonix  Consider GI cocktail    Cirrhosis with a History of Hepatitis C  Reports completion of therapy, follows with Dr. Melissa Gongora  Does not take nadolol on a regular basis, will hold for now    Bipolar Disorder  Restart home medications Effexor    GI & DVT prophylaxis  - Lovenox 40 mg daily and Protonix    Admit orders :  Type of admission : Observation  Estimated length of stay : less than 24 hours   Type of floor : Telemetry floor  Isolation contact : No  Respiratory care : Patient has an incentive spirometer   Oxygen therapy : Continue room air  Peripheral IV line : Yes IV access  Central line, PICC line : No  NG tube in place : No NG tube  PEG tube in place : No PEG tube  Jenkins in place : No Jenkins Catheter in place  Hemodialysis : Not needed  Wound care : Not needed  Pain control/Sedation : Tylenol 650 mg  Diet : General diet  Glucose protocol : Not indicated  Bowel regimen : Milk of magnesia,   Activity : Up as tolderated  DVT prophylaxis : Lovenox 40 mg daily  GI prophylaxis : Protonix for history of GERD    ----------------------------------------------------------------  Signed by :  Glen Gee M.D.., PGY-2    This case was discussed with Carrie)   Anna Daniels

## 2019-04-16 NOTE — ED PROVIDER NOTES
Patient is a 61 y.o. female who presents to ED for evaluation of chest pain. Seen earlier today at primary care physician's office for same symptoms. Onset of symptoms approximately 3 weeks prior to arrival. Patient reportedly went to [facility] [place] where troponin was reportedly 0.45, and it was advised that she should have a heart catheterization and was not a good candidate for stress test, left AMA on 3/29 \"because they were not doing anything\". Patient states that she continues to have chest pressure/squeezing and left shoulder pain for the past 3 days, constant in duration. Patient complains of worsening shortness of breath at baseline. Denies associated nausea, vomiting, or diaphoresis. Patient smokes one pack daily. Significant medical history of hypertension. Patient states that symptoms are continuous in nature. Significant family history of mother with MI at the age of 62, biological brother with \"heart problems\". Patient states that her last stress test was approximately greater than 10 years, does not have a cardiologist.          Review of Systems   Constitutional: Negative for chills, diaphoresis, fatigue and fever. HENT: Negative for congestion, ear pain, rhinorrhea, sinus pressure, sneezing, sore throat and trouble swallowing. Eyes: Negative for photophobia, pain and redness. Respiratory: Positive for chest tightness and shortness of breath. Negative for cough and wheezing. Cardiovascular: Positive for chest pain and palpitations. Gastrointestinal: Negative for abdominal distention, abdominal pain, blood in stool, constipation, diarrhea, nausea and vomiting. Endocrine: Negative for polydipsia and polyuria. Genitourinary: Negative for difficulty urinating, dysuria, flank pain, hematuria and urgency. Musculoskeletal: Negative for arthralgias, back pain, myalgias and neck pain. Skin: Negative for rash and wound.    Neurological: Negative for weakness, light-headedness, numbness admission. Heart Score for Risk of Major Adverse Cardiac Events    HISTORY  Highly suspicious  +2  Moderately suspicious +1  Slightly suspicious  +0    EKG  Significant ST depression +2  Non specific repolarization +1  Normal    +0    AGE  >=65    +2  45-65    +1  <45    +0    RISK FACTORS*  > or = 3 risk factors or  +2    history of atherosclerotic    disease. 1-2 risk factors  +1  No risk factors   +0    TROPONIN  >= 3 x normal limit  +2  1-3 x normal limit  +1  Within lormal range  +0    Total    5    *Risk factors  Risk factors: HTN, hypercholesterolemia, DM, obesity (BMI >30 kg/m²), smoking (current, or smoking cessation =3 mo), positive family history (parent or sibling with CVD before age 72); atherosclerotic disease: prior MI, PCI/CABG, CVA/TIA, or peripheral arterial disease    INTERPRETATION  0-3: 0.9-1.7% risk of adverse cardiac event. In the HEART Score, these patients were discharged. (0.99% retrospective)  (1.7% prospective)  4-6: 12-16.6% risk of adverse cardiac event. In the HEART Score, these patients were admitted to the hospital.    (11.6% retrospective) (16.6% prospective)  > or =7: 50-65% risk of adverse cardiac event. In the HEART Score, these patients were candidates for early invasive  measures.    (65.2% retrospective) (50.1% prospective)    MACE (Major Adverse Cardiac Events) is defined as: all-cause mortality, myocardial infarction, or coronary revascularization.     --------------------------------------------- PAST HISTORY ---------------------------------------------  Past Medical History:  has a past medical history of Arthritis, Bipolar 1 disorder ([facility] [address] Cirrhosis of liver due to hepatitis C, Depression, and Hypertension. Past Surgical History:  has a past surgical history that includes Hysterectomy; Hand surgery; Bunionectomy; Hammer toe surgery; Appendectomy; and Colon surgery. Social History:  reports that she has been smoking cigarettes.   She has a 15.00 pack-year smoking history. She has never used smokeless tobacco. She reports that she drank alcohol. She reports that she has current or past drug history. Drug: Marijuana. Frequency: 7.00 times per week. Family History: family history includes Diabetes in her maternal grandmother and mother; Heart Attack in her mother; Heart Disease in her mother. The patients home medications have been reviewed. Allergies: Patient has no known allergies.     -------------------------------------------------- RESULTS -------------------------------------------------    LABS:  Results for orders placed or performed during the hospital encounter of 04/16/19   CBC auto differential   Result Value Ref Range    WBC 9.2 4.5 - 11.5 E9/L    RBC 4.60 3.50 - 5.50 E12/L    Hemoglobin 14.0 11.5 - 15.5 g/dL    Hematocrit 40.8 34.0 - 48.0 %    MCV 88.7 80.0 - 99.9 fL    MCH 30.4 26.0 - 35.0 pg    MCHC 34.3 32.0 - 34.5 %    RDW 12.0 11.5 - 15.0 fL    Platelets 049 640 - 120 E9/L    MPV 9.4 7.0 - 12.0 fL    Neutrophils % 63.7 43.0 - 80.0 %    Immature Granulocytes % 0.2 0.0 - 5.0 %    Lymphocytes % 28.3 20.0 - 42.0 %    Monocytes % 6.4 2.0 - 12.0 %    Eosinophils % 0.9 0.0 - 6.0 %    Basophils % 0.5 0.0 - 2.0 %    Neutrophils # 5.86 1.80 - 7.30 E9/L    Immature Granulocytes # 0.02 E9/L    Lymphocytes # 2.60 1.50 - 4.00 E9/L    Monocytes # 0.59 0.10 - 0.95 E9/L    Eosinophils # 0.08 0.05 - 0.50 E9/L    Basophils # 0.05 0.00 - 0.20 D0/U   Basic Metabolic Panel   Result Value Ref Range    Sodium 144 132 - 146 mmol/L    Potassium 3.4 (L) 3.5 - 5.0 mmol/L    Chloride 105 98 - 107 mmol/L    CO2 23 22 - 29 mmol/L    Anion Gap 16 7 - 16 mmol/L    Glucose 103 (H) 74 - 99 mg/dL    BUN 12 8 - 23 mg/dL    CREATININE 0.8 0.5 - 1.0 mg/dL    GFR Non-African American >60 >=60 mL/min/1.73    GFR African American >60     Calcium 9.1 8.6 - 10.2 mg/dL   Troponin   Result Value Ref Range    Troponin <0.01 0.00 - 0.03 ng/mL   Brain Natriuretic Peptide Result Value Ref Range    Pro- (H) 0 - 125 pg/mL   Troponin   Result Value Ref Range    Troponin <0.01 0.00 - 0.03 ng/mL   Troponin   Result Value Ref Range    Troponin <0.01 0.00 - 0.03 ng/mL   CBC   Result Value Ref Range    WBC 6.0 4.5 - 11.5 E9/L    RBC 4.48 3.50 - 5.50 E12/L    Hemoglobin 13.7 11.5 - 15.5 g/dL    Hematocrit 39.7 34.0 - 48.0 %    MCV 88.6 80.0 - 99.9 fL    MCH 30.6 26.0 - 35.0 pg    MCHC 34.5 32.0 - 34.5 %    RDW 11.9 11.5 - 15.0 fL    Platelets 537 913 - 894 E9/L    MPV 9.9 7.0 - 12.0 fL   Basic Metabolic Panel w/ Reflex to MG   Result Value Ref Range    Sodium 141 132 - 146 mmol/L    Potassium reflex Magnesium 3.7 3.5 - 5.0 mmol/L    Chloride 109 (H) 98 - 107 mmol/L    CO2 19 (L) 22 - 29 mmol/L    Anion Gap 13 7 - 16 mmol/L    Glucose 86 74 - 99 mg/dL    BUN 12 8 - 23 mg/dL    CREATININE 0.6 0.5 - 1.0 mg/dL    GFR Non-African American >60 >=60 mL/min/1.73    GFR African American >60     Calcium 8.9 8.6 - 10.2 mg/dL   Magnesium   Result Value Ref Range    Magnesium 2.3 1.6 - 2.6 mg/dL   Lipid panel - fasting   Result Value Ref Range    Cholesterol, Total 146 0 - 199 mg/dL    Triglycerides 71 0 - 149 mg/dL    HDL 45 >40 mg/dL    LDL Calculated 87 0 - 99 mg/dL    VLDL Cholesterol Calculated 14 mg/dL   Brain natriuretic peptide   Result Value Ref Range    Pro- (H) 0 - 125 pg/mL       RADIOLOGY:  NM Cardiac Stress Test Nuclear Imaging   Final Result      Small fixed defect in the apex likely apical thinning. Prior apical   infarct is less likely. No evidence of stress-induced left ventricular myocardial ischemia. XR CHEST PORTABLE   Final Result   1. Vascular calcifications thoracic aorta. 2. Suspected bibasilar atelectasis. EKG: This EKG is signed and interpreted by me.     Rate: 69  Rhythm: Sinus  Interpretation: T wave inversions to anteroseptal leads with no appreciable ST changes  Comparison: No previous EKG for comparison      ------------------------- NURSING NOTES AND VITALS REVIEWED ---------------------------  Date / Time Roomed:  4/16/2019  3:29 PM  ED Bed Assignment:  [id number]    The nursing notes within the ED encounter and vital signs as below have been reviewed. Patient Vitals for the past 24 hrs:   BP Temp Temp src Pulse Resp SpO2 Height Weight   04/17/19 1145 (!) 143/79 98.7 °F (37.1 °C) Temporal 71 20 -- -- --   04/16/19 2214 (!) 128/59 98 °F (36.7 °C) Temporal 68 18 96 % -- --   04/16/19 1930 (!) 146/78 97.9 °F (36.6 °C) Temporal 69 18 97 % -- --   04/16/19 1744 (!) 143/81 -- -- 73 20 97 % -- --   04/16/19 1743 (!) 143/81 97.4 °F (36.3 °C) Oral 73 19 98 % -- --   04/16/19 1616 (!) 150/85 -- -- 76 16 95 % -- --   04/16/19 1530 138/85 97.4 °F (36.3 °C) Temporal 71 16 97 % 5' 2\" (1.575 m) 120 lb (54.4 kg)       Oxygen Saturation Interpretation: Normal    ------------------------------------------ PROGRESS NOTES ------------------------------------------  Re-evaluation(s):  Time: 4:14 PM  Patients symptoms show no change  Repeat physical examination is not changed    Counseling:  I have spoken with the patient and discussed todays results, in addition to providing specific details for the plan of care and counseling regarding the diagnosis and prognosis. Their questions are answered at this time and they are agreeable with the plan of admission.    --------------------------------- ADDITIONAL PROVIDER NOTES ---------------------------------  Consultations:  Time: 5:21 PM. Spoke with Dr. Daniela Turner. Discussed case. They will admit the patient. This patient's ED course included: a personal history and physicial examination, re-evaluation prior to disposition, multiple bedside re-evaluations, cardiac monitoring and continuous pulse oximetry    This patient has remained hemodynamically stable during their ED course. Diagnosis:  1.  Chest pain, unspecified type        Disposition:  Patient's disposition: Admit to telemetry  Patient's condition is stable.          Patti Harris DO  Resident  04/17/19 [id number]

## 2019-04-17 ENCOUNTER — APPOINTMENT (OUTPATIENT)
Dept: NUCLEAR MEDICINE | Age: 64
End: 2019-04-17
Payer: COMMERCIAL

## 2019-04-17 VITALS
WEIGHT: 120 LBS | TEMPERATURE: 98.7 F | BODY MASS INDEX: 22.08 KG/M2 | HEART RATE: 71 BPM | SYSTOLIC BLOOD PRESSURE: 143 MMHG | RESPIRATION RATE: 20 BRPM | HEIGHT: 62 IN | DIASTOLIC BLOOD PRESSURE: 79 MMHG | OXYGEN SATURATION: 96 %

## 2019-04-17 PROBLEM — R07.9 CHEST PAIN: Status: RESOLVED | Noted: 2019-04-16 | Resolved: 2019-04-17

## 2019-04-17 LAB
ANION GAP SERPL CALCULATED.3IONS-SCNC: 13 MMOL/L (ref 7–16)
BUN BLDV-MCNC: 12 MG/DL (ref 8–23)
CALCIUM SERPL-MCNC: 8.9 MG/DL (ref 8.6–10.2)
CHLORIDE BLD-SCNC: 109 MMOL/L (ref 98–107)
CHOLESTEROL, TOTAL: 146 MG/DL (ref 0–199)
CO2: 19 MMOL/L (ref 22–29)
CREAT SERPL-MCNC: 0.6 MG/DL (ref 0.5–1)
GFR AFRICAN AMERICAN: >60
GFR NON-AFRICAN AMERICAN: >60 ML/MIN/1.73
GLUCOSE BLD-MCNC: 86 MG/DL (ref 74–99)
HCT VFR BLD CALC: 39.7 % (ref 34–48)
HDLC SERPL-MCNC: 45 MG/DL
HEMOGLOBIN: 13.7 G/DL (ref 11.5–15.5)
LDL CHOLESTEROL CALCULATED: 87 MG/DL (ref 0–99)
LV EF: 64 %
LVEF MODALITY: NORMAL
MAGNESIUM: 2.3 MG/DL (ref 1.6–2.6)
MCH RBC QN AUTO: 30.6 PG (ref 26–35)
MCHC RBC AUTO-ENTMCNC: 34.5 % (ref 32–34.5)
MCV RBC AUTO: 88.6 FL (ref 80–99.9)
PDW BLD-RTO: 11.9 FL (ref 11.5–15)
PLATELET # BLD: 322 E9/L (ref 130–450)
PMV BLD AUTO: 9.9 FL (ref 7–12)
POTASSIUM REFLEX MAGNESIUM: 3.7 MMOL/L (ref 3.5–5)
RBC # BLD: 4.48 E12/L (ref 3.5–5.5)
SODIUM BLD-SCNC: 141 MMOL/L (ref 132–146)
TRIGL SERPL-MCNC: 71 MG/DL (ref 0–149)
VLDLC SERPL CALC-MCNC: 14 MG/DL
WBC # BLD: 6 E9/L (ref 4.5–11.5)

## 2019-04-17 PROCEDURE — 36415 COLL VENOUS BLD VENIPUNCTURE: CPT

## 2019-04-17 PROCEDURE — 6360000002 HC RX W HCPCS: Performed by: STUDENT IN AN ORGANIZED HEALTH CARE EDUCATION/TRAINING PROGRAM

## 2019-04-17 PROCEDURE — 99235 HOSP IP/OBS SAME DATE MOD 70: CPT | Performed by: FAMILY MEDICINE

## 2019-04-17 PROCEDURE — 93018 CV STRESS TEST I&R ONLY: CPT | Performed by: INTERNAL MEDICINE

## 2019-04-17 PROCEDURE — 93005 ELECTROCARDIOGRAM TRACING: CPT | Performed by: STUDENT IN AN ORGANIZED HEALTH CARE EDUCATION/TRAINING PROGRAM

## 2019-04-17 PROCEDURE — 80061 LIPID PANEL: CPT

## 2019-04-17 PROCEDURE — 78452 HT MUSCLE IMAGE SPECT MULT: CPT

## 2019-04-17 PROCEDURE — 3430000000 HC RX DIAGNOSTIC RADIOPHARMACEUTICAL: Performed by: RADIOLOGY

## 2019-04-17 PROCEDURE — 93017 CV STRESS TEST TRACING ONLY: CPT

## 2019-04-17 PROCEDURE — 80048 BASIC METABOLIC PNL TOTAL CA: CPT

## 2019-04-17 PROCEDURE — 83735 ASSAY OF MAGNESIUM: CPT

## 2019-04-17 PROCEDURE — G0378 HOSPITAL OBSERVATION PER HR: HCPCS

## 2019-04-17 PROCEDURE — 2580000003 HC RX 258: Performed by: STUDENT IN AN ORGANIZED HEALTH CARE EDUCATION/TRAINING PROGRAM

## 2019-04-17 PROCEDURE — 6370000000 HC RX 637 (ALT 250 FOR IP): Performed by: STUDENT IN AN ORGANIZED HEALTH CARE EDUCATION/TRAINING PROGRAM

## 2019-04-17 PROCEDURE — 93016 CV STRESS TEST SUPVJ ONLY: CPT | Performed by: INTERNAL MEDICINE

## 2019-04-17 PROCEDURE — 85027 COMPLETE CBC AUTOMATED: CPT

## 2019-04-17 PROCEDURE — A9500 TC99M SESTAMIBI: HCPCS | Performed by: RADIOLOGY

## 2019-04-17 RX ORDER — ASPIRIN 81 MG/1
81 TABLET, CHEWABLE ORAL DAILY
Qty: 30 TABLET | Refills: 3 | Status: SHIPPED | OUTPATIENT
Start: 2019-04-18 | End: 2020-05-15

## 2019-04-17 RX ORDER — POTASSIUM CHLORIDE 7.45 MG/ML
10 INJECTION INTRAVENOUS
Status: DISPENSED | OUTPATIENT
Start: 2019-04-17 | End: 2019-04-17

## 2019-04-17 RX ORDER — ATORVASTATIN CALCIUM 40 MG/1
40 TABLET, FILM COATED ORAL NIGHTLY
Status: DISCONTINUED | OUTPATIENT
Start: 2019-04-17 | End: 2019-04-17 | Stop reason: HOSPADM

## 2019-04-17 RX ORDER — SODIUM CHLORIDE 0.9 % (FLUSH) 0.9 %
10 SYRINGE (ML) INJECTION PRN
Status: DISCONTINUED | OUTPATIENT
Start: 2019-04-17 | End: 2019-04-17 | Stop reason: HOSPADM

## 2019-04-17 RX ORDER — ATORVASTATIN CALCIUM 40 MG/1
40 TABLET, FILM COATED ORAL NIGHTLY
Qty: 30 TABLET | Refills: 3 | Status: SHIPPED | OUTPATIENT
Start: 2019-04-17 | End: 2019-10-18 | Stop reason: SDUPTHER

## 2019-04-17 RX ORDER — CYCLOBENZAPRINE HCL 10 MG
10 TABLET ORAL 3 TIMES DAILY PRN
Qty: 30 TABLET | Refills: 0 | Status: SHIPPED | OUTPATIENT
Start: 2019-04-17 | End: 2019-04-23

## 2019-04-17 RX ORDER — METOPROLOL SUCCINATE 25 MG/1
25 TABLET, EXTENDED RELEASE ORAL DAILY
Qty: 30 TABLET | Refills: 3 | Status: SHIPPED | OUTPATIENT
Start: 2019-04-17 | End: 2019-06-27 | Stop reason: ALTCHOICE

## 2019-04-17 RX ADMIN — METOPROLOL TARTRATE 25 MG: 25 TABLET ORAL at 11:53

## 2019-04-17 RX ADMIN — Medication 30 MILLICURIE: at 09:46

## 2019-04-17 RX ADMIN — ASPIRIN 81 MG 81 MG: 81 TABLET ORAL at 11:53

## 2019-04-17 RX ADMIN — ESCITALOPRAM OXALATE 10 MG: 10 TABLET, FILM COATED ORAL at 11:52

## 2019-04-17 RX ADMIN — PANTOPRAZOLE SODIUM 40 MG: 40 TABLET, DELAYED RELEASE ORAL at 11:51

## 2019-04-17 RX ADMIN — AMLODIPINE BESYLATE 10 MG: 10 TABLET ORAL at 13:23

## 2019-04-17 RX ADMIN — Medication 10 ML: at 11:54

## 2019-04-17 RX ADMIN — REGADENOSON 0.4 MG: 0.08 INJECTION, SOLUTION INTRAVENOUS at 09:42

## 2019-04-17 RX ADMIN — Medication 10 MILLICURIE: at 07:56

## 2019-04-17 RX ADMIN — VALSARTAN 80 MG: 80 TABLET, FILM COATED ORAL at 11:51

## 2019-04-17 NOTE — PROCEDURES
Following placement of an intravenous catheter 10 millicuries of technetium 99m sestamibi was administered followed by a saline flush. Approximately 45 minutes later resting SPECT images were obtained. After completion of resting images a regadenoson stress test was performed via a bolus injection of 0.4 milligrams of regadenason followed by a saline flush. Following regadenoson administration 30 millicuries of technetium 99m sestamibi was injected followed by repeat post stress SPECT imaging approximately 60 minutes post completion of administration. The patient experienced no anginal symptoms and remained hemodynamically stable during administration and no electrocardiographic changes were noted. Perfusion images pending.

## 2019-04-17 NOTE — PROGRESS NOTES
[facility], Family Medicine Residency Program  Resident Progress  Note      Patient:  Heather Puentes 61 y.o. female MRN: [id number]     Date of Service: 4/17/2019      Subjective     61 y.o. female admitted on 4/16/2019  3:29 PM for chest pain. Patient was seen and examined this am after the stress test.   Does report intermittent left sided chest pain overnight described as squeezing. Asymptomatic when examined. No worsening SOBAR or DE LA PAZ, diaphoresis, blurry vision. Objective     Physical Exam:  · Vitals: BP (!) 143/79   Pulse 71   Temp 98.7 °F (37.1 °C) (Temporal)   Resp 20   Ht 5' 2\" (1.575 m)   Wt 120 lb (54.4 kg)   LMP 10/23/1990   SpO2 96%   BMI 21.95 kg/m²       · General Appearance: AAOX3  · HEENT:  NC/AT. · Neck: Trachea midline. No thyromegaly. No LAD. · Lung: Clear breath sounds B/l  · Heart: RRR, normal S1, S2.  · Abdomen: Soft, NT, ND. BS +olga. · Extremities: No leg edema. Pedal pulses 2+ symmetric b/l. · Musculokeletal: No joint swelling, no muscle tenderness. ROM normal in all joints of extremities.    · Neurologic: Mental status: AAOX3     Pertinent/ New Labs and Imaging Studies     CBC:   Lab Results   Component Value Date    WBC 6.0 04/17/2019    RBC 4.48 04/17/2019    HGB 13.7 04/17/2019    HCT 39.7 04/17/2019     04/17/2019    MCV 88.6 04/17/2019     BMP:    Lab Results   Component Value Date     04/17/2019    K 3.7 04/17/2019     04/17/2019    CO2 19 04/17/2019    BUN 12 04/17/2019    CREATININE 0.6 04/17/2019    GLUCOSE 86 04/17/2019    GLUCOSE 107 10/22/2011    CALCIUM 8.9 04/17/2019     Imaging  [unfilled]    Resident's Assessment and PLan     Atypical Chest pain with ACS low risk  Secondary to ACS vs GERD, asymptomatic on admission   Initial troponin: 0.01, Cycle trops X2 nl  EKG shows: T wave inversions in lateral leads  Sublingual Nitro and Aspirin 324 given in the ED  CVD Risk 8 %, HEART score 4  40 mg atorvastatin, metoprolol 25 BID  Continuation of valsartan  Oxygen with target sats >90%  Stress test today     Hypertension  Restart home medications valsartan and amlodipine.      GERD  GERD dietary modifications  Protonix  Consider GI cocktail     Cirrhosis with a History of Hepatitis C  Reports completion of therapy, follows with Dr. Newton Going  Does not take nadolol on a regular basis, will hold for now     Bipolar Disorder  Restart home medications Effexor     GI & DVT prophylaxis  - Lovenox 40 mg daily and Protonix     Kina Prado M.D., PGY3  Family Medicine     Attending physician: Dr. Mitali Medina

## 2019-04-17 NOTE — CARE COORDINATION
Attempted to meet with patient at bedside to discuss transition of care. However, she is currently out of the room at testing. Per nursing, pt ambulates independently. For stress test today. Dc plan for home when stable. No needs anticipated.

## 2019-04-17 NOTE — PROGRESS NOTES
[facility] Attending    S: 61 y.o. female with concerns about chest pain. Started 4 weeks ago. She went to [facility] and they wanted to cath her. She left AMA on 3/29. She has records on her phone from her admission. States her troponin was 0.45 there. She continues with chest pressure/squeezing and left shoulder pain. +dyspnea at baseline. No n/v or sweating. Her mother had an MI at the age of 62. She had an AICD. + tobacco.    This morning, she has a small amount of squeezing. O: VS- Blood pressure (!) 143/79, pulse 71, temperature 98.7 °F (37.1 °C), temperature source Temporal, resp. rate 20, height 5' 2\" (1.575 m), weight 120 lb (54.4 kg), last menstrual period 10/23/1990, SpO2 96 %, not currently breastfeeding. Exam is as noted by resident with the following changes, additions or corrections:              General: NAD              CV:  RRR, no gallops, rubs, or murmurs              Resp: CTAB no R/R/W, nonttp over chest wall              Abd:  Soft, nontender, no masses               Ext:  no C/C/E    Impressions:   Principal Problem:    Chest pain  Active Problems:    Hypertension    Bipolar 1 disorder (HCC)    Cirrhosis of liver due to hepatitis C    GERD (gastroesophageal reflux disease)  Resolved Problems:    * No resolved hospital problems. *      Plan:   Had serial ekgs, neg troponins. For stress test today. Risk factor modification, tobacco cessation with patch for now. Lipitor increased to 40mg. If stress test negative, anticipate discharge to home later today. Attending Physician Statement  I have reviewed the chart and seen the patient with the resident(s). I personally reviewed images, EKG's and similar tests, if present. I personally reviewed and performed key elements of the history and exam.  I have reviewed and confirmed student and/or resident history and exam with changes as indicated above.   I agree with the assessment, plan and orders as documented by the resident. Please refer to the resident and/or student note for additional information.       Wayne Aviles MD

## 2019-04-18 ENCOUNTER — TELEPHONE (OUTPATIENT)
Dept: FAMILY MEDICINE CLINIC | Age: 64
End: 2019-04-18

## 2019-04-18 NOTE — TELEPHONE ENCOUNTER
[facility] Transitions Initial Follow Up Call    Outreach made within 2 business days of discharge: Yes    Patient: Chuckie Galvan Patient : 1955   MRN: [id number]  Reason for Admission: There are no discharge diagnoses documented for the most recent discharge. Discharge Date: 19       Spoke with: patient    Discharge department/facility: [facility]     TCM Interactive Patient Contact:  Was patient able to fill all prescriptions: No: will obtain today  Was patient instructed to bring all medications to the follow-up visit: Yes  Is patient taking all medications as directed in the discharge summary?  Yes  Does patient understand their discharge instructions: Yes  Does patient have questions or concerns that need addressed prior to 7-14 day follow up office visit: no    Scheduled appointment with PCP within 7-14 days    Follow Up  Future Appointments   Date Time Provider Ally Burns   2019  3:30 PM Alis Ellis MD [facility] [place], [place]

## 2019-04-19 LAB
EKG ATRIAL RATE: 64 BPM
EKG P AXIS: 73 DEGREES
EKG P-R INTERVAL: 164 MS
EKG Q-T INTERVAL: 460 MS
EKG QRS DURATION: 100 MS
EKG QTC CALCULATION (BAZETT): 474 MS
EKG R AXIS: -4 DEGREES
EKG T AXIS: 87 DEGREES
EKG VENTRICULAR RATE: 64 BPM

## 2019-04-19 PROCEDURE — 93010 ELECTROCARDIOGRAM REPORT: CPT | Performed by: FAMILY MEDICINE

## 2019-04-23 ENCOUNTER — HOSPITAL ENCOUNTER (OUTPATIENT)
Age: 64
Discharge: HOME | End: 2019-04-25
Payer: COMMERCIAL

## 2019-04-23 ENCOUNTER — OFFICE VISIT (OUTPATIENT)
Dept: FAMILY MEDICINE CLINIC | Age: 64
End: 2019-04-23
Payer: COMMERCIAL

## 2019-04-23 VITALS
BODY MASS INDEX: 23.55 KG/M2 | DIASTOLIC BLOOD PRESSURE: 77 MMHG | TEMPERATURE: 98.5 F | HEART RATE: 68 BPM | SYSTOLIC BLOOD PRESSURE: 127 MMHG | OXYGEN SATURATION: 94 % | RESPIRATION RATE: 12 BRPM | HEIGHT: 62 IN | WEIGHT: 128 LBS

## 2019-04-23 DIAGNOSIS — R07.9 CHEST PAIN, UNSPECIFIED TYPE: Primary | ICD-10-CM

## 2019-04-23 DIAGNOSIS — Z09 HOSPITAL DISCHARGE FOLLOW-UP: ICD-10-CM

## 2019-04-23 DIAGNOSIS — F17.200 HEAVY TOBACCO SMOKER: ICD-10-CM

## 2019-04-23 DIAGNOSIS — I10 ESSENTIAL HYPERTENSION: ICD-10-CM

## 2019-04-23 DIAGNOSIS — J43.8 OTHER EMPHYSEMA (HCC): ICD-10-CM

## 2019-04-23 LAB
ANION GAP SERPL CALCULATED.3IONS-SCNC: 11 MMOL/L (ref 7–16)
BUN BLDV-MCNC: 16 MG/DL (ref 8–23)
CALCIUM SERPL-MCNC: 8.9 MG/DL (ref 8.6–10.2)
CHLORIDE BLD-SCNC: 106 MMOL/L (ref 98–107)
CO2: 26 MMOL/L (ref 22–29)
CREAT SERPL-MCNC: 1 MG/DL (ref 0.5–1)
EKG ATRIAL RATE: 69 BPM
EKG ATRIAL RATE: 71 BPM
EKG P AXIS: 51 DEGREES
EKG P AXIS: 74 DEGREES
EKG P-R INTERVAL: 156 MS
EKG P-R INTERVAL: 160 MS
EKG Q-T INTERVAL: 446 MS
EKG Q-T INTERVAL: 502 MS
EKG QRS DURATION: 104 MS
EKG QRS DURATION: 106 MS
EKG QTC CALCULATION (BAZETT): 484 MS
EKG QTC CALCULATION (BAZETT): 537 MS
EKG R AXIS: -14 DEGREES
EKG R AXIS: 24 DEGREES
EKG T AXIS: 59 DEGREES
EKG T AXIS: 88 DEGREES
EKG VENTRICULAR RATE: 69 BPM
EKG VENTRICULAR RATE: 71 BPM
GFR AFRICAN AMERICAN: >60
GFR NON-AFRICAN AMERICAN: 56 ML/MIN/1.73
GLUCOSE BLD-MCNC: 95 MG/DL (ref 74–99)
POTASSIUM SERPL-SCNC: 3.5 MMOL/L (ref 3.5–5)
SODIUM BLD-SCNC: 143 MMOL/L (ref 132–146)

## 2019-04-23 PROCEDURE — 80048 BASIC METABOLIC PNL TOTAL CA: CPT

## 2019-04-23 PROCEDURE — 99495 TRANSJ CARE MGMT MOD F2F 14D: CPT | Performed by: STUDENT IN AN ORGANIZED HEALTH CARE EDUCATION/TRAINING PROGRAM

## 2019-04-23 PROCEDURE — 99212 OFFICE O/P EST SF 10 MIN: CPT | Performed by: STUDENT IN AN ORGANIZED HEALTH CARE EDUCATION/TRAINING PROGRAM

## 2019-04-23 PROCEDURE — 36415 COLL VENOUS BLD VENIPUNCTURE: CPT

## 2019-04-23 PROCEDURE — 1111F DSCHRG MED/CURRENT MED MERGE: CPT | Performed by: STUDENT IN AN ORGANIZED HEALTH CARE EDUCATION/TRAINING PROGRAM

## 2019-04-23 PROCEDURE — 36415 COLL VENOUS BLD VENIPUNCTURE: CPT | Performed by: FAMILY MEDICINE

## 2019-04-23 RX ORDER — ALBUTEROL SULFATE 90 UG/1
2 AEROSOL, METERED RESPIRATORY (INHALATION) 4 TIMES DAILY PRN
Qty: 3 INHALER | Refills: 3 | Status: SHIPPED | OUTPATIENT
Start: 2019-04-23 | End: 2022-07-08 | Stop reason: SDUPTHER

## 2019-04-23 RX ORDER — NICOTINE 21 MG/24HR
1 PATCH, TRANSDERMAL 24 HOURS TRANSDERMAL DAILY
Qty: 30 PATCH | Refills: 0 | Status: SHIPPED | OUTPATIENT
Start: 2019-04-23 | End: 2019-06-27

## 2019-04-23 ASSESSMENT — ENCOUNTER SYMPTOMS
CHEST TIGHTNESS: 1
SHORTNESS OF BREATH: 0
BLOOD IN STOOL: 0
WHEEZING: 0
VOMITING: 0
NAUSEA: 0
SORE THROAT: 0
ABDOMINAL DISTENTION: 0
ABDOMINAL PAIN: 0
COUGH: 0

## 2019-04-23 NOTE — PROGRESS NOTES
Post-Discharge Transitional Care Management Services  Nurse/MA Progress Note     Chuckie Galvan, 1955  Date of Visit:  4/23/2019     Please evaluate the following checklist (all answers must be yes)     The care coordinator documented communication with the patient/caretaker within 2 business days of the discharge date and filed an encounter? Yes If NO - convert to an office visit; patient does not qualify for HITESH visit     If YES - go to next question   The discharge information is available for the physician to review? Yes If NO - convert to an office visit; patient does not qualify for HITESH visit     If YES - go to next question   Is this visit within 7 or 14 days of discharge? Yes, less than 7 days of discharge date. Able to bill [id number] for high complexity visit or [phone] for moderate complexity visit. If NO - convert to an office visit; patient does not qualify for HITESH visit     If YES - go to next question   Is the visit the first TCM in the 30d prior to this admission. Yes - this is the first TCM within the time period including 30d prior to this currently discussed admission. If NO - convert to an office visit; patient does not qualify for HITESH visit      If YES - go on to room the patient as a TCM visit. The Doctor should use the system smart phrase TCMPN as their note template. This visit requires attending presence if completed by a resident.     Billing codes should be as above    - 23344 - moderate complexity (visit occurring between 1-14d after discharge)   - 99156 - high complexity (high complexity visit occurring between 1-7d after discharge)

## 2019-04-24 NOTE — PROGRESS NOTES
Post-Discharge Transitional Care Management Services or Hospital Follow Up      Carlos James   YOB: 1955    Date of Office Visit:  4/23/2019  Date of Hospital Admission: 4/16/19  Date of Hospital Discharge: 4/17/19  Readmission Risk Score(high >=14%.  Medium >=10%):Readmission Risk Score: 0      Care management risk score Rising risk (score 2-5) and Complex Care (Scores >=6): 4     Non face to face  following discharge, date last encounter closed (first attempt may have been earlier): 4/18/2019 10:49 AM 4/18/2019 10:49 AM    Call initiated 2 business days of discharge: Yes     Patient Active Problem List   Diagnosis    Depression, [facility]    Hypertension    Viral hepatitis B with hepatitis delta    Addiction, marijuana ([facility]    [facility]    Cirrhosis of liver due to hepatitis C    GERD (gastroesophageal reflux disease)       No Known Allergies    Medications listed as ordered at the time of discharge from hospital   María Aguilera   [facility] Medication Instructions [id number]    Printed on:04/23/19 2025   Medication Information                      albuterol sulfate  (90 Base) MCG/ACT inhaler  Inhale 2 puffs into the lungs 4 times daily as needed for Wheezing             amLODIPine (NORVASC) 10 MG tablet  Take 10 mg by mouth daily             aspirin 81 MG chewable tablet  Take 1 tablet by mouth daily             atorvastatin (LIPITOR) 40 MG tablet  Take 1 tablet by mouth nightly             escitalopram (LEXAPRO) 10 MG tablet  Take 10 mg by mouth daily             melatonin 3 MG TABS tablet  Take 3 mg by mouth nightly as needed             metoprolol succinate (TOPROL XL) 25 MG extended release tablet  Take 1 tablet by mouth daily             nicotine (NICODERM CQ) 14 MG/24HR  Place 1 patch onto the skin daily             PROAIR  (90 Base) MCG/ACT inhaler               valsartan (DIOVAN) 80 MG tablet  Take 1 tablet by mouth daily Medications marked \"taking\" at this time  Outpatient Medications Marked as Taking for the 4/23/19 encounter (Office Visit) with Shila Miles MD   Medication Sig Dispense Refill    albuterol sulfate  (90 Base) MCG/ACT inhaler Inhale 2 puffs into the lungs 4 times daily as needed for Wheezing 3 Inhaler 3    nicotine (NICODERM CQ) 14 MG/24HR Place 1 patch onto the skin daily 30 patch 0        Medications patient taking as of now reconciled against medications ordered at time of hospital discharge: Yes    Chief Complaint   Patient presents with   Charisse Dickinson Care Management     Transitional care management visit    Other     Needs medication reviewed       HPI    Inpatient course: Discharge summary reviewed- see chart. Interval history/Current status:   Miss Patsie Kayser states she is not feeling as anxious about her chest pressure / squeezing sensation on the left side as she was before hospitalization. She feels she has sensation as her heart beats funny or she has racing of heart. She denies any chest pain, orthopnea or leg swelling. She denies any increased breathing difficulty or sob. She is using her albuterol inhaler 3 times daily. She denies any fever, chills, nausea or vomiting. She states she continues to smoke. On asking about what she thinks of about smoking she shares she knows if she quits smoking that will be helpful for her overall health and she states she would like to try using nicotine patch as that helped her cravings. Review of Systems   Constitutional: Negative for activity change, fatigue, fever and unexpected weight change. HENT: Negative for sore throat. Eyes: Negative for visual disturbance. Respiratory: Positive for chest tightness. Negative for cough, shortness of breath and wheezing. Cardiovascular: Positive for palpitations. Negative for chest pain and leg swelling.    Gastrointestinal: Negative for abdominal distention, abdominal pain, blood in stool, nausea and vomiting. Genitourinary: Negative for dysuria. Musculoskeletal: Negative for arthralgias and myalgias. Neurological: Negative for dizziness. Vitals:    04/23/19 1537   BP: 127/77   Site: Right Upper Arm   Position: Sitting   Cuff Size: Medium Adult   Pulse: 68   Resp: 12   Temp: 98.5 °F (36.9 °C)   TempSrc: Oral   SpO2: 94%   Weight: 128 lb (58.1 kg)   Height: 5' 2\" (1.575 m)     Body mass index is 23.41 kg/m². Wt Readings from Last 3 Encounters:   04/23/19 128 lb (58.1 kg)   04/16/19 120 lb (54.4 kg)   04/16/19 124 lb (56.2 kg)     BP Readings from Last 3 Encounters:   04/23/19 127/77   04/17/19 (!) 143/79   04/16/19 129/84       Physical Exam   Constitutional: No distress. HENT:   Head: Normocephalic and atraumatic. Right Ear: External ear normal.   Left Ear: External ear normal.   Mouth/Throat: Oropharynx is clear and moist.   Eyes: Pupils are equal, round, and reactive to light. Neck: Normal range of motion. Neck supple. Cardiovascular: Normal rate, regular rhythm, normal heart sounds and intact distal pulses. No murmur heard. No peripheral edema or leg swelling   Pulmonary/Chest: Breath sounds normal. No respiratory distress. She has no wheezes. She exhibits no tenderness. Abdominal: Soft. Assessment/Plan:  1. Other emphysema ([address]  Patient has history of smoking, historically has been using albuterol  Have requested records from past doctor to obtain PFT records  - albuterol sulfate  (90 Base) MCG/ACT inhaler; Inhale 2 puffs into the lungs 4 times daily as needed for Wheezing  Dispense: 3 Inhaler; Refill: 3    H/o Hypokalemia  Plan is to check for K and if needed start on K replacement   - BASIC METABOLIC PANEL; Future  - BASIC METABOLIC PANEL    2.  Essential hypertension  Controlled at today's visit with current medical management   Patient has been advised to check BP at home and report at next visit  Referral to cardiology for further evaluation for palpitation  - Echocardiogram complete; Future  [facility]    3. Heavy tobacco smoker  Patient is motivated to quit, a discussion was done patient chose to try nicotine patch for a month and a referral to cessation program made  - nicotine (NICODERM CQ) 14 MG/24HR; Place 1 patch onto the skin daily  Dispense: 30 patch; Refill: 0  - Internal Referral To Smoking Cessation Program    4. Hypertensive heart disease with heart failure (HCC)   Last ECHO stage one diastolic heart failure   - Echocardiogram complete;  Future  - Gaudencio Wright MD, Cardiology, [facility] Decision Making: moderate complexity

## 2019-05-01 ENCOUNTER — TELEPHONE (OUTPATIENT)
Dept: ADMINISTRATIVE | Age: 64
End: 2019-05-01

## 2019-05-01 NOTE — TELEPHONE ENCOUNTER
NP calling to schedule with Cardiology [place]\" scheduled with SB on: 6/27/19. Dx/ essential hypertension. Recent stress future echo order in [facility]. Saw cardiology consult [address] 1 mth ago - she will have faxed to the office. Cardiology Hx sheet scanned.

## 2019-06-27 ENCOUNTER — OFFICE VISIT (OUTPATIENT)
Dept: CARDIOLOGY CLINIC | Age: 64
End: 2019-06-27
Payer: COMMERCIAL

## 2019-06-27 VITALS
HEIGHT: 62 IN | HEART RATE: 66 BPM | SYSTOLIC BLOOD PRESSURE: 150 MMHG | OXYGEN SATURATION: 96 % | DIASTOLIC BLOOD PRESSURE: 80 MMHG | BODY MASS INDEX: 23.3 KG/M2 | WEIGHT: 126.6 LBS | RESPIRATION RATE: 20 BRPM

## 2019-06-27 DIAGNOSIS — I10 ESSENTIAL HYPERTENSION: Primary | ICD-10-CM

## 2019-06-27 DIAGNOSIS — B19.10 VIRAL HEPATITIS B WITH HEPATITIS DELTA: ICD-10-CM

## 2019-06-27 DIAGNOSIS — B18.2 HEPATIC CIRRHOSIS DUE TO CHRONIC HEPATITIS C INFECTION (HCC): ICD-10-CM

## 2019-06-27 DIAGNOSIS — J44.9 CHRONIC OBSTRUCTIVE PULMONARY DISEASE, UNSPECIFIED COPD TYPE (HCC): ICD-10-CM

## 2019-06-27 DIAGNOSIS — F31.9 BIPOLAR 1 DISORDER (HCC): ICD-10-CM

## 2019-06-27 DIAGNOSIS — F19.10 SUBSTANCE ABUSE (HCC): ICD-10-CM

## 2019-06-27 DIAGNOSIS — K74.60 HEPATIC CIRRHOSIS DUE TO CHRONIC HEPATITIS C INFECTION (HCC): ICD-10-CM

## 2019-06-27 PROCEDURE — 93000 ELECTROCARDIOGRAM COMPLETE: CPT | Performed by: INTERNAL MEDICINE

## 2019-06-27 PROCEDURE — G8427 DOCREV CUR MEDS BY ELIG CLIN: HCPCS | Performed by: INTERNAL MEDICINE

## 2019-06-27 PROCEDURE — 99204 OFFICE O/P NEW MOD 45 MIN: CPT | Performed by: INTERNAL MEDICINE

## 2019-06-27 PROCEDURE — G8420 CALC BMI NORM PARAMETERS: HCPCS | Performed by: INTERNAL MEDICINE

## 2019-06-27 PROCEDURE — 3023F SPIROM DOC REV: CPT | Performed by: INTERNAL MEDICINE

## 2019-06-27 PROCEDURE — 3017F COLORECTAL CA SCREEN DOC REV: CPT | Performed by: INTERNAL MEDICINE

## 2019-06-27 PROCEDURE — 4004F PT TOBACCO SCREEN RCVD TLK: CPT | Performed by: INTERNAL MEDICINE

## 2019-06-27 PROCEDURE — G8926 SPIRO NO PERF OR DOC: HCPCS | Performed by: INTERNAL MEDICINE

## 2019-06-27 RX ORDER — ESOMEPRAZOLE MAGNESIUM 40 MG/1
40 CAPSULE, DELAYED RELEASE ORAL
COMMUNITY
End: 2019-10-18 | Stop reason: SDUPTHER

## 2019-06-27 RX ORDER — LACTULOSE 10 G/15ML
15 SOLUTION ORAL 2 TIMES DAILY
COMMUNITY
End: 2022-02-21

## 2019-06-27 RX ORDER — METOPROLOL SUCCINATE 25 MG/1
25 TABLET, EXTENDED RELEASE ORAL DAILY
COMMUNITY
End: 2020-05-15 | Stop reason: SDDI

## 2019-06-27 SDOH — HEALTH STABILITY: MENTAL HEALTH: HOW OFTEN DO YOU HAVE A DRINK CONTAINING ALCOHOL?: 2-4 TIMES A MONTH

## 2019-06-27 SDOH — HEALTH STABILITY: MENTAL HEALTH: HOW MANY STANDARD DRINKS CONTAINING ALCOHOL DO YOU HAVE ON A TYPICAL DAY?: 1 OR 2

## 2019-06-27 NOTE — PATIENT INSTRUCTIONS
smoking, you improve the health of everyone who now breathes in your smoke. · Their heart, lung, and cancer risks drop, much like yours. · They are sick less. For babies and small children, living smoke-free means they're less likely to have ear infections, pneumonia, and bronchitis. · If you're a woman who is or will be pregnant someday, quitting smoking means a healthier . · Children who are close to you are less likely to become adult smokers. Where can you learn more? Go to [email or web address] org and sign in to your [employer] account. Enter [phone] in the [facility] box to learn more about \"Learning About Benefits From Quitting Smoking. \"     If you do not have an account, please click on the \"Sign Up Now\" link. Current as of: 2018  Content Version: 12.0  © 7916-1703 Healthwise, Incorporated. Care instructions adapted under license by [facility] ([facility]). If you have questions about a medical condition or this instruction, always ask your healthcare professional. [address] any warranty or liability for your use of this information.

## 2019-06-27 NOTE — PROGRESS NOTES
OUTPATIENT CARDIOLOGY CONSULT    Name: Indigo Finch    Age: 61 y.o. Date of Service: 6/27/2019    Reason for Consultation: Atypical chest pain, chronic obstructive lung disease, hypertension, hepatitis B and C with associated cirrhosis, bipolar disorder    Referring Physician: Andrew Schaefer M.D. History of Present Illness: The patient is a 77-year-old white female with no documented structural heart disease and a previous history of substance abuse with associated hepatitis B and C with associated cirrhosis. She additionally has a history of hypertension and prolonged tobacco consumption with associated chronic obstructive lung disease. He relates hospitalization at [facility]note 3[facility] following a reported abnormal troponin level obtained by her primary care physician in the absence of her reports of symptoms and follow-up of laboratory assessment of her hepatitis and cirrhosis and the year she signed out against medical advise. She subsequently presented to [facility] where her electrocardiogram and cardiac biomarkers were unremarkable and a low risk myocardial perfusion imaging study is obtained. Subsequent to hospital discharge, she is noted a single episode of unprovoked sharp stabbing right parasternal chest discomfort with no symptoms of anginal-like chest discomfort or other ischemic equivalents. Unfortunately, she continues ongoing tobacco consumption of approximately 1 pack of cigarettes on a daily basis and presents with evidence of stage I systolic hypertension. No additional manifestations of decompensated left ventricular systolic dysfunction nor volume overload no arrhythmia related symptomatology as noted. Review of Systems:    The remainder of a complete multisystem review including consitutional, central nervous, respiratory, circulatory, gastrointestinal, genitourinary, endocrinologic, hematologic, musculoskeletal and psychiatric are negative.     Past Medical History:  Past Medical History:   Diagnosis Date    Arthritis     Bipolar [facility]     Cirrhosis of liver due to hepatitis C 1986    Depression     Hepatitis C     Hypertension        Past Surgical History:  Past Surgical History:   Procedure Laterality Date    APPENDECTOMY      BUNIONECTOMY      COLON SURGERY      HAMMER TOE SURGERY      HAND SURGERY      HYSTERECTOMY         Family History:  Family History   Problem Relation Age of Onset    Diabetes Mother     Heart Disease Mother     Heart Attack Mother     Other Mother         s/p ICD    Diabetes Maternal Grandmother        Social History:  Social History     Socioeconomic History    Marital status:      Spouse name: Not on file    Number of children: Not on file    Years of education: Not on file    Highest education level: Not on file   Occupational History    Not on file   Social Needs    Financial resource strain: Not on file    Food insecurity:     Worry: Not on file     Inability: Not on file    Transportation needs:     Medical: Not on file     Non-medical: Not on file   Tobacco Use    Smoking status: Current Every Day Smoker     Packs/day: 1.00     Years: 16.00     Pack years: 16.00     Types: Cigarettes    Smokeless tobacco: Never Used   Substance and Sexual Activity    Alcohol use: Yes     Frequency: 2-4 times a month     Drinks per session: 1 or 2     Binge frequency: Never    Drug use: Yes     Frequency: 7.0 times per week     Types: Marijuana    Sexual activity: Yes     Partners: Male   Lifestyle    Physical activity:     Days per week: Not on file     Minutes per session: Not on file    Stress: Not on file   Relationships    Social connections:     Talks on phone: Not on file     Gets together: Not on file     Attends [employer] service: Not on file     Active member of club or organization: Not on file     Attends meetings of clubs or organizations: Not on file Relationship status: Not on file    Intimate partner violence:     Fear of current or ex partner: Not on file     Emotionally abused: Not on file     Physically abused: Not on file     Forced sexual activity: Not on file   Other Topics Concern    Not on file   Social History Narrative    Drinks 2L Coke & 3 cups of coffee daily. Allergies:  No Known Allergies    Current Medications:  Current Outpatient Medications   Medication Sig Dispense Refill    metoprolol tartrate (LOPRESSOR) 25 MG tablet one tab daily  0    esomeprazole (NEXIUM) 40 MG delayed release capsule Take 40 mg by mouth every morning (before breakfast)      metoprolol succinate (TOPROL XL) 25 MG extended release tablet Take 25 mg by mouth daily      calcium carbonate-vitamin D (CALTRATE 600+D) 600-400 MG-UNIT TABS per tab Take 2 tablets by mouth daily      cycloSPORINE (RESTASIS OP) Apply to eye 2 times daily      lactulose (CHRONULAC) 10 GM/15ML solution Take 15 mLs by mouth 2 times daily      albuterol sulfate  (90 Base) MCG/ACT inhaler Inhale 2 puffs into the lungs 4 times daily as needed for Wheezing 3 Inhaler 3    nicotine (NICODERM CQ) 14 MG/24HR Place 1 patch onto the skin daily 30 patch 0    aspirin 81 MG chewable tablet Take 1 tablet by mouth daily 30 tablet 3    atorvastatin (LIPITOR) 40 MG tablet Take 1 tablet by mouth nightly 30 tablet 3    escitalopram (LEXAPRO) 10 MG tablet Take 10 mg by mouth daily      amLODIPine (NORVASC) 10 MG tablet Take 10 mg by mouth daily      valsartan (DIOVAN) 80 MG tablet Take 1 tablet by mouth daily 30 tablet 1     No current facility-administered medications for this visit.           Physical Exam:  BP (!) 150/80   Pulse 66   Resp 20   Ht 5' 2\" (1.575 m)   Wt 126 lb 9.6 oz (57.4 kg)   LMP 10/23/1990   SpO2 96%   BMI 23.16 kg/m²   Wt Readings from Last 3 Encounters:   06/27/19 126 lb 9.6 oz (57.4 kg)   04/23/19 128 lb (58.1 kg)   04/16/19 120 lb (54.4 kg)     The patient is awake, alert and in no discomfort or distress. No gross musculoskeletal deformity or lymphadenopathy are present. No significant skin or nail changes are present. Gross examination of head, eyes, nose and throat are negative. Jugular venous pressure is normal and no carotid bruits are present. No thyromegaly is noted. Normal respiratory effort is noted with no accessory muscle usage present. Lung fields are clear to ascultation. Cardiac examination is notable for a regular rate and rhythm with no palpable thrill. No gallop rhythm or cardiac murmur are identified. A benign abdominal examination is present with no masses or organomegaly. A normal abdominal aortic pulsation is present. Intact pulses are present throughout all extremities and no peripheral edema is present. No focal neurologic deficits are present. Laboratory Tests:  Lab Results   Component Value Date    CREATININE 1.0 04/23/2019    BUN 16 04/23/2019     04/23/2019    K 3.5 04/23/2019     04/23/2019    CO2 26 04/23/2019     No results found for: BNP  Lab Results   Component Value Date    WBC 6.0 04/17/2019    RBC 4.48 04/17/2019    HGB 13.7 04/17/2019    HCT 39.7 04/17/2019    MCV 88.6 04/17/2019    MCH 30.6 04/17/2019    MCHC 34.5 04/17/2019    RDW 11.9 04/17/2019     04/17/2019    MPV 9.9 04/17/2019     No results for input(s): ALKPHOS, ALT, AST, PROT, BILITOT, BILIDIR, LABALBU in the last 72 hours.   Lab Results   Component Value Date    MG 2.3 04/17/2019     No results found for: PROTIME, INR  No results found for: TSH  No components found for: CHLPL  Lab Results   Component Value Date    TRIG 71 04/17/2019     Lab Results   Component Value Date    HDL 45 04/17/2019     Lab Results   Component Value Date    LDLCALC 87 04/17/2019       Cardiac Tests:  ECG: A resting electrocardiogram demonstrates evidence of sinus rhythm with nonpathologic inferior Q waves and nonspecific ST changes  Chest X-ray: A chest x-ray April, 2019 patient's care. Please feel free to contact me if you have any questions or concerns. Note: This report was completed utilizing a computerized voice recognition software. Every effort has been made to insure accuracy, however; inadvertent computerized transcription errors may be present. Hazel Abebe.  Yani Reynoso, [address] [place] [address]    An electronic copy of this consult note was forwarded to Dr. Connie Bucio

## 2019-09-18 ENCOUNTER — TELEPHONE (OUTPATIENT)
Dept: FAMILY MEDICINE CLINIC | Age: 64
End: 2019-09-18

## 2019-10-18 RX ORDER — AMLODIPINE BESYLATE 10 MG/1
10 TABLET ORAL DAILY
Qty: 90 TABLET | Refills: 3 | Status: SHIPPED
Start: 2019-10-18 | End: 2020-10-29

## 2019-10-18 RX ORDER — ESCITALOPRAM OXALATE 10 MG/1
10 TABLET ORAL DAILY
Qty: 90 TABLET | Refills: 3 | Status: SHIPPED | OUTPATIENT
Start: 2019-10-18 | End: 2022-02-21

## 2019-10-18 RX ORDER — CYCLOSPORINE 0.5 MG/ML
1 EMULSION OPHTHALMIC 2 TIMES DAILY
Qty: 5 VIAL | Refills: 2 | Status: SHIPPED | OUTPATIENT
Start: 2019-10-18 | End: 2022-02-21

## 2019-10-18 RX ORDER — ESOMEPRAZOLE MAGNESIUM 40 MG/1
40 CAPSULE, DELAYED RELEASE ORAL
Qty: 90 CAPSULE | Refills: 3 | Status: SHIPPED | OUTPATIENT
Start: 2019-10-18 | End: 2022-02-21

## 2019-10-18 RX ORDER — ATORVASTATIN CALCIUM 40 MG/1
40 TABLET, FILM COATED ORAL NIGHTLY
Qty: 30 TABLET | Refills: 3 | Status: SHIPPED
Start: 2019-10-18 | End: 2020-02-13

## 2019-10-18 RX ORDER — VALSARTAN 80 MG/1
80 TABLET ORAL DAILY
Qty: 30 TABLET | Refills: 1 | Status: SHIPPED | OUTPATIENT
Start: 2019-10-18 | End: 2020-01-13

## 2019-12-10 ENCOUNTER — TELEPHONE (OUTPATIENT)
Dept: FAMILY MEDICINE CLINIC | Age: 64
End: 2019-12-10

## 2019-12-10 DIAGNOSIS — I10 ESSENTIAL HYPERTENSION: ICD-10-CM

## 2019-12-10 DIAGNOSIS — J44.9 CHRONIC OBSTRUCTIVE PULMONARY DISEASE, UNSPECIFIED COPD TYPE (HCC): Primary | ICD-10-CM

## 2020-01-13 RX ORDER — VALSARTAN 80 MG/1
TABLET ORAL
Qty: 30 TABLET | Refills: 1 | Status: SHIPPED
Start: 2020-01-13 | End: 2020-02-13

## 2020-02-13 RX ORDER — ATORVASTATIN CALCIUM 40 MG/1
TABLET, FILM COATED ORAL
Qty: 30 TABLET | Refills: 3 | Status: SHIPPED
Start: 2020-02-13 | End: 2020-03-09

## 2020-02-13 RX ORDER — VALSARTAN 80 MG/1
TABLET ORAL
Qty: 30 TABLET | Refills: 1 | Status: SHIPPED
Start: 2020-02-13 | End: 2020-03-09

## 2020-03-09 RX ORDER — VALSARTAN 80 MG/1
TABLET ORAL
Qty: 30 TABLET | Refills: 1 | Status: SHIPPED
Start: 2020-03-09 | End: 2020-05-17

## 2020-03-09 RX ORDER — ATORVASTATIN CALCIUM 40 MG/1
TABLET, FILM COATED ORAL
Qty: 30 TABLET | Refills: 3 | Status: SHIPPED
Start: 2020-03-09 | End: 2020-09-10

## 2020-05-15 ENCOUNTER — OFFICE VISIT (OUTPATIENT)
Dept: FAMILY MEDICINE CLINIC | Age: 65
End: 2020-05-15
Payer: COMMERCIAL

## 2020-05-15 VITALS
BODY MASS INDEX: 23.16 KG/M2 | OXYGEN SATURATION: 98 % | TEMPERATURE: 98.9 F | SYSTOLIC BLOOD PRESSURE: 134 MMHG | DIASTOLIC BLOOD PRESSURE: 82 MMHG | HEIGHT: 60 IN | HEART RATE: 78 BPM | RESPIRATION RATE: 14 BRPM | WEIGHT: 118 LBS

## 2020-05-15 PROCEDURE — G8427 DOCREV CUR MEDS BY ELIG CLIN: HCPCS | Performed by: FAMILY MEDICINE

## 2020-05-15 PROCEDURE — 99213 OFFICE O/P EST LOW 20 MIN: CPT | Performed by: STUDENT IN AN ORGANIZED HEALTH CARE EDUCATION/TRAINING PROGRAM

## 2020-05-15 PROCEDURE — 3017F COLORECTAL CA SCREEN DOC REV: CPT | Performed by: FAMILY MEDICINE

## 2020-05-15 PROCEDURE — G8420 CALC BMI NORM PARAMETERS: HCPCS | Performed by: FAMILY MEDICINE

## 2020-05-15 PROCEDURE — 99212 OFFICE O/P EST SF 10 MIN: CPT | Performed by: STUDENT IN AN ORGANIZED HEALTH CARE EDUCATION/TRAINING PROGRAM

## 2020-05-15 PROCEDURE — 4004F PT TOBACCO SCREEN RCVD TLK: CPT | Performed by: FAMILY MEDICINE

## 2020-05-15 RX ORDER — HYDROXYZINE HYDROCHLORIDE 25 MG/1
25 TABLET, FILM COATED ORAL NIGHTLY
Qty: 30 TABLET | Refills: 0 | Status: SHIPPED | OUTPATIENT
Start: 2020-05-15 | End: 2020-06-14

## 2020-05-15 RX ORDER — ARIPIPRAZOLE 10 MG/1
10 TABLET ORAL DAILY
Qty: 30 TABLET | Refills: 0 | Status: SHIPPED
Start: 2020-05-15 | End: 2020-06-09

## 2020-05-15 RX ORDER — UMECLIDINIUM 62.5 UG/1
AEROSOL, POWDER ORAL
COMMUNITY
End: 2022-02-21 | Stop reason: ALTCHOICE

## 2020-05-15 NOTE — PROGRESS NOTES
Patient 71-year-old female with history of depression, bipolar 1 presenting with complaints of not able to sleep, not able to relax, constant worry, decreased energy, decreased concentration, decreased  Mood after her daughter was diagnosed with fulminant hepatic failure and went into coma currently at [facility] under sedation tracheostomy. Patient has been worried about that a lot and always thinking and worrying about her daughter. In recent past patient was only taking Lexapro but she states she has not been taking it for many years. In the past she has taken fluoxetine citalopram paroxetine. She states she is to see her psychiatrist 8 years ago and she stopped seeing him after she was diagnosed with cirrhosis of liver. She also has taken benzodiazepines trazodone. And her electronic medical record could not see a mood stabilizer medication or antipsychotic. Review of system  Patient denies chest pain shortness of breath wheezing cough nausea vomiting  Denies fever chills  Appetite at baseline  Denies weight loss sweating      General appearance: Patient teary during conversation intermittently, appears not to be any significant distress. Perrl     oral mucosa appears moist,    heart: regular rhythm, no murmur or gallop    Lungs clear anterior and posterior    Abdomen supple     No masses, tenderness or organomegaly    Normal bowel sounds    Normal musculature . Patient appears to be in low mood anxious and depressed, denies suicidal ideation intent or plan    1. DEBRA score more than 15, PHQ 9 score more than 15  History of bipolar  Severe symptoms  Trial of Abilify  PRN hydroxyzine  Referral to psychiatry establish care    Attending Physician Statement  I have discussed the case, including pertinent history and exam findings with the resident. I agree with the documented assessment and plan.
Ext- Denies weakness, or paresthesias. Physical Exam :    VITAL SIGNS :   Vitals:    05/15/20 1053   BP: 134/82   Site: Left Upper Arm   Position: Sitting   Cuff Size: Medium Adult   Pulse: 78   Resp: 14   Temp: 98.9 °F (37.2 °C)   TempSrc: Oral   SpO2: 98%   Weight: 118 lb (53.5 kg)   Height: 5' (1.524 m)       GENERAL APPEARANCE : NAD, cooperative, appears stated age, tearful   EYES : PERRL, EOM's intact, no icterus   HENT : AT/NC, oropharynx clear and without exudates  LUNGS : Respiration unlabored, vesicular breath sounds in BL peripheral lungs with no wheezes, rhonchi or rales  HEART : RRR, normal S1/S2, no murmur noted  ABDOMEN : Normoactive bowel sounds,soft, non-tender, no masses  EXTREMITIES : No peripheral edema,  peripheral pulses +2/4 BL in upper and lower extremities  PSYCHIATRIC : Depression, insomnia, anxiety  DEBRA - score more than 15  PHQ- 9 more than 15             Assessment & Plan :    Monster Singh was seen today for check-up and anxiety. Diagnoses and all orders for this visit:    DEBRA (generalized anxiety disorder)  -     hydrOXYzine (ATARAX) 25 MG tablet; Take 1 tablet by mouth nightly    Bipolar 1 disorder ([address]  Depressive episode   Acute stressor poor health of daughter   Discussed medication, referral and follow up in 2 weeks  -     ARIPiprazole (ABILIFY) 10 MG tablet; Take 1 tablet by mouth daily    RTO in 2 weeks    Additional plan and future considerations:-   Follow up in two weeks, will need to establish with psychiatry    Future Appointments   Date Time Provider Ally Burns   5/29/2020  1:00 PM MD Isabelle [facility]   ----------------------------------------------------------------  Signed by :  Tracy Marsh M.D., PGY-2  This case was discussed with Dr. Keo Thrasher

## 2020-05-17 RX ORDER — VALSARTAN 80 MG/1
TABLET ORAL
Qty: 60 TABLET | Refills: 2 | Status: SHIPPED
Start: 2020-05-17 | End: 2020-07-09

## 2020-07-09 RX ORDER — VALSARTAN 80 MG/1
TABLET ORAL
Qty: 60 TABLET | Refills: 2 | Status: SHIPPED | OUTPATIENT
Start: 2020-07-09 | End: 2022-02-21 | Stop reason: SDUPTHER

## 2020-09-10 RX ORDER — ATORVASTATIN CALCIUM 40 MG/1
40 TABLET, FILM COATED ORAL DAILY
Qty: 90 TABLET | Refills: 1 | Status: SHIPPED | OUTPATIENT
Start: 2020-09-10 | End: 2022-02-21

## 2020-10-30 RX ORDER — AMLODIPINE BESYLATE 10 MG/1
TABLET ORAL
Qty: 90 TABLET | Refills: 1 | Status: SHIPPED | OUTPATIENT
Start: 2020-10-30 | End: 2022-02-21 | Stop reason: SDUPTHER

## 2022-02-17 ENCOUNTER — TELEPHONE (OUTPATIENT)
Dept: FAMILY MEDICINE CLINIC | Age: 67
End: 2022-02-17

## 2022-02-17 NOTE — TELEPHONE ENCOUNTER
----- Message from Boni Muniz sent at 2/17/2022  8:54 AM EST -----  Subject: Message to Provider    QUESTIONS  Information for Provider? Request Call back. Patient is vomiting, fever   and chills. would like prescription called into pharmacy  ---------------------------------------------------------------------------  --------------  [address]  What is the best way for the office to contact you? OK to leave message on   voicemail  Preferred Call Back Phone Number? [phone]  ---------------------------------------------------------------------------  --------------  SCRIPT ANSWERS  Relationship to Patient?  Self

## 2022-02-18 ENCOUNTER — TELEPHONE (OUTPATIENT)
Dept: FAMILY MEDICINE CLINIC | Age: 67
End: 2022-02-18

## 2022-02-18 DIAGNOSIS — J06.9 VIRAL URI: Primary | ICD-10-CM

## 2022-02-18 RX ORDER — ONDANSETRON 4 MG/1
4 TABLET, ORALLY DISINTEGRATING ORAL 3 TIMES DAILY PRN
Qty: 21 TABLET | Refills: 0 | Status: SHIPPED | OUTPATIENT
Start: 2022-02-18

## 2022-02-18 RX ORDER — ACETAMINOPHEN 500 MG
1000 TABLET ORAL EVERY 6 HOURS PRN
Qty: 120 TABLET | Refills: 0 | COMMUNITY
Start: 2022-02-18 | End: 2022-02-21

## 2022-02-18 NOTE — TELEPHONE ENCOUNTER
Called in meds and covid test for patient    Please refer to telephone encounter for more information. Thank you!     Fouzia Weber MD  2/18/2022  9:30 AM

## 2022-02-18 NOTE — TELEPHONE ENCOUNTER
----- Message from Terence Cheema sent at 2/18/2022  1:06 PM EST -----  Subject: Message to Provider    QUESTIONS  Information for Provider? The pt says she took a covid test today and it   was negative.  ---------------------------------------------------------------------------  --------------  CALL BACK INFO  What is the best way for the office to contact you? OK to leave message on   voicemail  Preferred Call Back Phone Number? [phone]  ---------------------------------------------------------------------------  --------------  SCRIPT ANSWERS  Relationship to Patient?  Self breastfeeding exclusively

## 2022-02-18 NOTE — TELEPHONE ENCOUNTER
Called patient to ask about symptoms. Patient has been sick for the past 4 days. Feels subjective fevers, has had multiple bouts of emesis and diarrhea. Also feels pain and \"Popping\" of right ear. Patient does not have a thermometer at home. For the last few days, has been unable to tolerate PO intake. Has been tolerating fluids. Does not recall eating anything different, no sick contacts. Has been vaccinated against covid 19 ; pfizer x 2  Denies any difficulty breathing. Plan:  - send zofran and tylenol for now  - covid 19 ambulatory test ordered  - patient advised of locations for testing. If she wants, she can also go to a pharmacy to get tested.   - once test results revealed, patient may schedule clinic appointment for further eval.     Symone Gongora MD  2/18/2022  9:27 AM

## 2022-02-21 ENCOUNTER — OFFICE VISIT (OUTPATIENT)
Dept: FAMILY MEDICINE CLINIC | Age: 67
End: 2022-02-21
Payer: COMMERCIAL

## 2022-02-21 VITALS
OXYGEN SATURATION: 97 % | DIASTOLIC BLOOD PRESSURE: 63 MMHG | BODY MASS INDEX: 21.6 KG/M2 | TEMPERATURE: 98.1 F | WEIGHT: 110 LBS | HEART RATE: 90 BPM | SYSTOLIC BLOOD PRESSURE: 137 MMHG | HEIGHT: 60 IN

## 2022-02-21 DIAGNOSIS — Z11.59 ENCOUNTER FOR SCREENING FOR OTHER VIRAL DISEASES: ICD-10-CM

## 2022-02-21 DIAGNOSIS — J06.9 VIRAL URI: Primary | ICD-10-CM

## 2022-02-21 DIAGNOSIS — I10 PRIMARY HYPERTENSION: ICD-10-CM

## 2022-02-21 DIAGNOSIS — J06.9 VIRAL URI: ICD-10-CM

## 2022-02-21 DIAGNOSIS — Z13.31 POSITIVE DEPRESSION SCREENING: ICD-10-CM

## 2022-02-21 DIAGNOSIS — B19.20 HEPATITIS C VIRUS INFECTION WITHOUT HEPATIC COMA, UNSPECIFIED CHRONICITY: ICD-10-CM

## 2022-02-21 PROCEDURE — G8420 CALC BMI NORM PARAMETERS: HCPCS | Performed by: FAMILY MEDICINE

## 2022-02-21 PROCEDURE — G8484 FLU IMMUNIZE NO ADMIN: HCPCS | Performed by: FAMILY MEDICINE

## 2022-02-21 PROCEDURE — 4040F PNEUMOC VAC/ADMIN/RCVD: CPT | Performed by: FAMILY MEDICINE

## 2022-02-21 PROCEDURE — 4004F PT TOBACCO SCREEN RCVD TLK: CPT | Performed by: FAMILY MEDICINE

## 2022-02-21 PROCEDURE — 1123F ACP DISCUSS/DSCN MKR DOCD: CPT | Performed by: FAMILY MEDICINE

## 2022-02-21 PROCEDURE — G8427 DOCREV CUR MEDS BY ELIG CLIN: HCPCS | Performed by: FAMILY MEDICINE

## 2022-02-21 PROCEDURE — 36415 COLL VENOUS BLD VENIPUNCTURE: CPT | Performed by: FAMILY MEDICINE

## 2022-02-21 PROCEDURE — 3017F COLORECTAL CA SCREEN DOC REV: CPT | Performed by: FAMILY MEDICINE

## 2022-02-21 PROCEDURE — 99212 OFFICE O/P EST SF 10 MIN: CPT | Performed by: STUDENT IN AN ORGANIZED HEALTH CARE EDUCATION/TRAINING PROGRAM

## 2022-02-21 PROCEDURE — G8399 PT W/DXA RESULTS DOCUMENT: HCPCS | Performed by: FAMILY MEDICINE

## 2022-02-21 PROCEDURE — 99213 OFFICE O/P EST LOW 20 MIN: CPT | Performed by: FAMILY MEDICINE

## 2022-02-21 PROCEDURE — 1090F PRES/ABSN URINE INCON ASSESS: CPT | Performed by: FAMILY MEDICINE

## 2022-02-21 RX ORDER — FLUTICASONE PROPIONATE 50 MCG
2 SPRAY, SUSPENSION (ML) NASAL DAILY
Qty: 48 G | Refills: 1 | Status: SHIPPED
Start: 2022-02-21 | End: 2022-07-08 | Stop reason: SDUPTHER

## 2022-02-21 RX ORDER — VALSARTAN 80 MG/1
TABLET ORAL
Qty: 60 TABLET | Refills: 2 | Status: SHIPPED
Start: 2022-02-21 | End: 2022-09-01

## 2022-02-21 RX ORDER — CETIRIZINE HYDROCHLORIDE 10 MG/1
10 TABLET ORAL DAILY
Qty: 30 TABLET | Refills: 5 | Status: SHIPPED
Start: 2022-02-21 | End: 2022-07-08 | Stop reason: SDUPTHER

## 2022-02-21 RX ORDER — AMLODIPINE BESYLATE 10 MG/1
TABLET ORAL
Qty: 90 TABLET | Refills: 1 | Status: SHIPPED
Start: 2022-02-21 | End: 2022-07-08 | Stop reason: SDUPTHER

## 2022-02-21 RX ORDER — QUETIAPINE FUMARATE 25 MG/1
25 TABLET, FILM COATED ORAL NIGHTLY
Qty: 60 TABLET | Refills: 1 | Status: SHIPPED
Start: 2022-02-21 | End: 2022-03-11

## 2022-02-21 SDOH — ECONOMIC STABILITY: FOOD INSECURITY: WITHIN THE PAST 12 MONTHS, YOU WORRIED THAT YOUR FOOD WOULD RUN OUT BEFORE YOU GOT MONEY TO BUY MORE.: NEVER TRUE

## 2022-02-21 SDOH — ECONOMIC STABILITY: FOOD INSECURITY: WITHIN THE PAST 12 MONTHS, THE FOOD YOU BOUGHT JUST DIDN'T LAST AND YOU DIDN'T HAVE MONEY TO GET MORE.: NEVER TRUE

## 2022-02-21 ASSESSMENT — COLUMBIA-SUICIDE SEVERITY RATING SCALE - C-SSRS
7. DID THIS OCCUR IN THE LAST THREE MONTHS: NO
2. HAVE YOU ACTUALLY HAD ANY THOUGHTS OF KILLING YOURSELF?: NO
6. HAVE YOU EVER DONE ANYTHING, STARTED TO DO ANYTHING, OR PREPARED TO DO ANYTHING TO END YOUR LIFE?: YES
1. WITHIN THE PAST MONTH, HAVE YOU WISHED YOU WERE DEAD OR WISHED YOU COULD GO TO SLEEP AND NOT WAKE UP?: YES

## 2022-02-21 ASSESSMENT — PATIENT HEALTH QUESTIONNAIRE - PHQ9
2. FEELING DOWN, DEPRESSED OR HOPELESS: 3
1. LITTLE INTEREST OR PLEASURE IN DOING THINGS: 3
3. TROUBLE FALLING OR STAYING ASLEEP: 3
4. FEELING TIRED OR HAVING LITTLE ENERGY: 2
10. IF YOU CHECKED OFF ANY PROBLEMS, HOW DIFFICULT HAVE THESE PROBLEMS MADE IT FOR YOU TO DO YOUR WORK, TAKE CARE OF THINGS AT HOME, OR GET ALONG WITH OTHER PEOPLE: 1
9. THOUGHTS THAT YOU WOULD BE BETTER OFF DEAD, OR OF HURTING YOURSELF: 2
SUM OF ALL RESPONSES TO PHQ9 QUESTIONS 1 & 2: 6
5. POOR APPETITE OR OVEREATING: 0
6. FEELING BAD ABOUT YOURSELF - OR THAT YOU ARE A FAILURE OR HAVE LET YOURSELF OR YOUR FAMILY DOWN: 3
SUM OF ALL RESPONSES TO PHQ QUESTIONS 1-9: 20
SUM OF ALL RESPONSES TO PHQ QUESTIONS 1-9: 20
8. MOVING OR SPEAKING SO SLOWLY THAT OTHER PEOPLE COULD HAVE NOTICED. OR THE OPPOSITE, BEING SO FIGETY OR RESTLESS THAT YOU HAVE BEEN MOVING AROUND A LOT MORE THAN USUAL: 1
SUM OF ALL RESPONSES TO PHQ QUESTIONS 1-9: 18
7. TROUBLE CONCENTRATING ON THINGS, SUCH AS READING THE NEWSPAPER OR WATCHING TELEVISION: 3
SUM OF ALL RESPONSES TO PHQ QUESTIONS 1-9: 20

## 2022-02-21 ASSESSMENT — SOCIAL DETERMINANTS OF HEALTH (SDOH): HOW HARD IS IT FOR YOU TO PAY FOR THE VERY BASICS LIKE FOOD, HOUSING, MEDICAL CARE, AND HEATING?: NOT HARD AT ALL

## 2022-02-21 ASSESSMENT — LIFESTYLE VARIABLES: HOW OFTEN DO YOU HAVE A DRINK CONTAINING ALCOHOL: NEVER

## 2022-02-21 NOTE — PROGRESS NOTES
disorder ([facility] [address]     Cirrhosis of liver due to hepatitis C 1986    Depression     Hepatitis C     Hypertension      Past Surgical History:   Procedure Laterality Date    APPENDECTOMY      BUNIONECTOMY      COLON SURGERY      HAMMER TOE SURGERY      HAND SURGERY      HYSTERECTOMY         Allergies :   No Known Allergies    Medication List :    Current Outpatient Medications   Medication Sig Dispense Refill    amLODIPine (NORVASC) 10 MG tablet take 1 tablet by mouth once daily 90 tablet 1    valsartan (DIOVAN) 80 MG tablet take 1 tablet by mouth once daily 60 tablet 2    QUEtiapine (SEROQUEL) 25 MG tablet Take 1 tablet by mouth nightly 60 tablet 1    fluticasone (FLONASE) 50 MCG/ACT nasal spray 2 sprays by Each Nostril route daily 48 g 1    cetirizine (ZYRTEC) 10 MG tablet Take 1 tablet by mouth daily 30 tablet 5    ondansetron (ZOFRAN-ODT) 4 MG disintegrating tablet Take 1 tablet by mouth 3 times daily as needed for Nausea or Vomiting 21 tablet 0    albuterol sulfate  (90 Base) MCG/ACT inhaler Inhale 2 puffs into the lungs 4 times daily as needed for Wheezing 3 Inhaler 3    TRELEGY ELLIPTA 100-62.5-25 MCG/INH AEPB Inhale 1 puff into the lungs daily      nicotine (NICODERM CQ) 14 MG/24HR Place 1 patch onto the skin daily 30 patch 0     No current facility-administered medications for this visit.        ______________________________________________________________________    Physical Exam :    Vitals: /63 (Site: Left Upper Arm, Position: Sitting, Cuff Size: Medium Adult)   Pulse 90   Temp 98.1 °F (36.7 °C) (Temporal)   Ht 5' (1.524 m)   Wt 110 lb (49.9 kg)   LMP 10/23/1990   SpO2 97%   BMI 21.48 kg/m²   General Appearance: Awake, alert, oriented, and in NAD  HEENT: NCAT, no pallor or icterus; mild TM fullness; no sinus tenderness to palpation; boggy turbinates; no cervical LAD  Neck: Symmetrical, trachea midline. Chest wall/Lung: CTAB, respirations unlabored.  No ronchi/wheezing/rales   Heart: RRR, normal S1 and S2, no murmurs, rubs or gallops  Abdomen: SNTND  Extremities: Extremities normal, atraumatic, no cyanosis, clubbing or edema. Neurologic: Alert&Oriented x3. No focal motor deficits detected   Psychiatric: Normal mood. Normal affect. Normal behavior  ______________________________________________________________________    Assessment & Plan :    1. Viral URI  · Viral more likely than bacterial  · If no improvement with flonase or zyrtec in next few days, call office or return  - Comprehensive Metabolic Panel; Future  - CBC with Auto Differential; Future  - fluticasone (FLONASE) 50 MCG/ACT nasal spray; 2 sprays by Each Nostril route daily  Dispense: 48 g; Refill: 1  - cetirizine (ZYRTEC) 10 MG tablet; Take 1 tablet by mouth daily  Dispense: 30 tablet; Refill: 5    2. Positive depression screening  · Past hx of depression and bipolar, start seroquel  - QUEtiapine (SEROQUEL) 25 MG tablet; Take 1 tablet by mouth nightly  Dispense: 60 tablet; Refill: 1    3. Primary hypertension  · Currently well controlled  · Labs, refill chronic meds  - Comprehensive Metabolic Panel; Future  - CBC with Auto Differential; Future  - amLODIPine (NORVASC) 10 MG tablet; take 1 tablet by mouth once daily  Dispense: 90 tablet; Refill: 1  - valsartan (DIOVAN) 80 MG tablet; take 1 tablet by mouth once daily  Dispense: 60 tablet; Refill: 2    4. Hepatitis C virus infection without hepatic coma, unspecified chronicity  - HEP C VIRAL LOAD; Future  - Hepatitis B Surface Antigen; Future  - Hepatitis B Core Antibody, Total; Future  - Hepatitis B Surface Antibody; Future    5. Encounter for screening for other viral diseases   - Hepatitis B Surface Antigen; Future      Additional plan and future considerations:       Return to Office: Return in about 4 weeks (around 3/21/2022) for depression.     Macy Morrissey DO   Case discussed with Dr. Dashawn Foley      PHQ-9 score today: (PHQ-9 Total Score: 20), additional evaluation and assessment performed, follow-up plan includes but not limited to: Medication management and Referral to /Specialist  for evaluation and management.

## 2022-02-21 NOTE — PROGRESS NOTES
S: 77 y.o. female presents today for Dizziness and Nausea & Vomiting (F/U)    Depression  PHQ-9 Total Score: 20 (2/21/2022  9:34 AM)  Thoughts that you would be better off dead, or of hurting yourself in some way: 2 (2/21/2022  9:34 AM)  previously on Abilify; did not take this due to se  Life stressors; trouble staying asleep  Feels safe at home; No SI/HI    URI symptoms for 1 week; dizziness / spinning sensation with associated nausea and vomiting that has since resolved, sensation of ears being clogged; slight persistent dizziness with positional changes; slight chills with sweats; covid rapid neg; slight HA; post nasal drainage and cough; zofran helps      O: VS: /63 (Site: Left Upper Arm, Position: Sitting, Cuff Size: Medium Adult)   Pulse 90   Temp 98.1 °F (36.7 °C) (Temporal)   Ht 5' (1.524 m)   Wt 110 lb (49.9 kg)   LMP 10/23/1990   SpO2 97%   BMI 21.48 kg/m²   AAO/NAD, appropriate affect for mood  ENT:  slight clear fluid behind membrane; no sinus tenderness; boggy turbinates; no cervical LAD  CV:  RRR, no murmur  Resp: CTAB  Abdomen: SNTND  Ext: no edema    Assessment/Plan:   1) Depression / Bipolar:  Start Seroquel 25mg nightly for now; start to follow with counseling / psych  2) URI symptoms - covid PCR; supportive care; start flonase and zyrtec; if symptoms persist call or return to office  3) Hx of Hep C - repeat labs  4) HM as ordered; labs as ordered  RTO: 2 weeks close f/u    Attending Physician Statement  I have discussed the case, including pertinent history and exam findings with the resident. I agree with the documented assessment and plan.       Electronically signed by Annalise Bojorquez MD on 2/23/2022 at 12:00 PM

## 2022-02-22 LAB — SARS-COV-2, PCR: NOT DETECTED

## 2022-02-22 RX ORDER — FLUTICASONE FUROATE, UMECLIDINIUM BROMIDE AND VILANTEROL TRIFENATATE 100; 62.5; 25 UG/1; UG/1; UG/1
1 POWDER RESPIRATORY (INHALATION) DAILY
COMMUNITY
Start: 2021-12-22 | End: 2022-03-10 | Stop reason: SDUPTHER

## 2022-03-10 ENCOUNTER — OFFICE VISIT (OUTPATIENT)
Dept: FAMILY MEDICINE CLINIC | Age: 67
End: 2022-03-10
Payer: COMMERCIAL

## 2022-03-10 VITALS
WEIGHT: 111 LBS | BODY MASS INDEX: 21.79 KG/M2 | HEART RATE: 80 BPM | TEMPERATURE: 97.9 F | HEIGHT: 60 IN | DIASTOLIC BLOOD PRESSURE: 71 MMHG | OXYGEN SATURATION: 97 % | SYSTOLIC BLOOD PRESSURE: 133 MMHG

## 2022-03-10 DIAGNOSIS — F31.9 BIPOLAR 1 DISORDER (HCC): ICD-10-CM

## 2022-03-10 DIAGNOSIS — J44.9 CHRONIC OBSTRUCTIVE PULMONARY DISEASE, UNSPECIFIED COPD TYPE (HCC): ICD-10-CM

## 2022-03-10 DIAGNOSIS — F33.9 RECURRENT MAJOR DEPRESSIVE DISORDER, REMISSION STATUS UNSPECIFIED (HCC): Primary | ICD-10-CM

## 2022-03-10 DIAGNOSIS — R51.9 ACUTE NONINTRACTABLE HEADACHE, UNSPECIFIED HEADACHE TYPE: ICD-10-CM

## 2022-03-10 DIAGNOSIS — J06.9 VIRAL URI: ICD-10-CM

## 2022-03-10 DIAGNOSIS — I10 PRIMARY HYPERTENSION: ICD-10-CM

## 2022-03-10 LAB
BASOPHILS ABSOLUTE: 0.05 E9/L (ref 0–0.2)
BASOPHILS RELATIVE PERCENT: 0.6 % (ref 0–2)
EOSINOPHILS ABSOLUTE: 0.08 E9/L (ref 0.05–0.5)
EOSINOPHILS RELATIVE PERCENT: 0.9 % (ref 0–6)
HCT VFR BLD CALC: 42 % (ref 34–48)
HEMOGLOBIN: 13.7 G/DL (ref 11.5–15.5)
IMMATURE GRANULOCYTES #: 0.02 E9/L
IMMATURE GRANULOCYTES %: 0.2 % (ref 0–5)
LYMPHOCYTES ABSOLUTE: 2.91 E9/L (ref 1.5–4)
LYMPHOCYTES RELATIVE PERCENT: 32.9 % (ref 20–42)
MCH RBC QN AUTO: 29.9 PG (ref 26–35)
MCHC RBC AUTO-ENTMCNC: 32.6 % (ref 32–34.5)
MCV RBC AUTO: 91.7 FL (ref 80–99.9)
MONOCYTES ABSOLUTE: 0.41 E9/L (ref 0.1–0.95)
MONOCYTES RELATIVE PERCENT: 4.6 % (ref 2–12)
NEUTROPHILS ABSOLUTE: 5.38 E9/L (ref 1.8–7.3)
NEUTROPHILS RELATIVE PERCENT: 60.8 % (ref 43–80)
PDW BLD-RTO: 12 FL (ref 11.5–15)
PLATELET # BLD: 321 E9/L (ref 130–450)
PMV BLD AUTO: 11.1 FL (ref 7–12)
RBC # BLD: 4.58 E12/L (ref 3.5–5.5)
WBC # BLD: 8.9 E9/L (ref 4.5–11.5)

## 2022-03-10 PROCEDURE — G8399 PT W/DXA RESULTS DOCUMENT: HCPCS | Performed by: STUDENT IN AN ORGANIZED HEALTH CARE EDUCATION/TRAINING PROGRAM

## 2022-03-10 PROCEDURE — G8420 CALC BMI NORM PARAMETERS: HCPCS | Performed by: STUDENT IN AN ORGANIZED HEALTH CARE EDUCATION/TRAINING PROGRAM

## 2022-03-10 PROCEDURE — 99213 OFFICE O/P EST LOW 20 MIN: CPT | Performed by: STUDENT IN AN ORGANIZED HEALTH CARE EDUCATION/TRAINING PROGRAM

## 2022-03-10 PROCEDURE — 3023F SPIROM DOC REV: CPT | Performed by: STUDENT IN AN ORGANIZED HEALTH CARE EDUCATION/TRAINING PROGRAM

## 2022-03-10 PROCEDURE — 4040F PNEUMOC VAC/ADMIN/RCVD: CPT | Performed by: STUDENT IN AN ORGANIZED HEALTH CARE EDUCATION/TRAINING PROGRAM

## 2022-03-10 PROCEDURE — 1123F ACP DISCUSS/DSCN MKR DOCD: CPT | Performed by: STUDENT IN AN ORGANIZED HEALTH CARE EDUCATION/TRAINING PROGRAM

## 2022-03-10 PROCEDURE — 4004F PT TOBACCO SCREEN RCVD TLK: CPT | Performed by: STUDENT IN AN ORGANIZED HEALTH CARE EDUCATION/TRAINING PROGRAM

## 2022-03-10 PROCEDURE — G8427 DOCREV CUR MEDS BY ELIG CLIN: HCPCS | Performed by: STUDENT IN AN ORGANIZED HEALTH CARE EDUCATION/TRAINING PROGRAM

## 2022-03-10 PROCEDURE — 1090F PRES/ABSN URINE INCON ASSESS: CPT | Performed by: STUDENT IN AN ORGANIZED HEALTH CARE EDUCATION/TRAINING PROGRAM

## 2022-03-10 PROCEDURE — G8484 FLU IMMUNIZE NO ADMIN: HCPCS | Performed by: STUDENT IN AN ORGANIZED HEALTH CARE EDUCATION/TRAINING PROGRAM

## 2022-03-10 PROCEDURE — 99212 OFFICE O/P EST SF 10 MIN: CPT | Performed by: STUDENT IN AN ORGANIZED HEALTH CARE EDUCATION/TRAINING PROGRAM

## 2022-03-10 PROCEDURE — 3017F COLORECTAL CA SCREEN DOC REV: CPT | Performed by: STUDENT IN AN ORGANIZED HEALTH CARE EDUCATION/TRAINING PROGRAM

## 2022-03-10 PROCEDURE — 36415 COLL VENOUS BLD VENIPUNCTURE: CPT | Performed by: STUDENT IN AN ORGANIZED HEALTH CARE EDUCATION/TRAINING PROGRAM

## 2022-03-10 NOTE — PROGRESS NOTES
[facility] RESIDENCY PROGRAM  DATE OF VISIT : 3/10/2022    Patient : Luis Nuno   Age : 77 y.o.  : 1955   MRN : [id number]   ______________________________________________________________________    Chief Complaint :   Chief Complaint   Patient presents with    URI     follow up    Headache     x3 days    Depression     follow up        HPI : Luis Nuno is 77 y.o. female who presented to the clinic today for follow-up of upper respiratory infection about 2 weeks ago as well as depression. Patient also has concern of headaches today.     When patient was presented in February, she had had an upper respiratory infection most likely viral in nature. She states that most symptoms have since resolved. She is still getting some dizziness with movement but states it is improving, and that she only gets some spinning sensation when she lays down or when she first sits up in the morning.     Last visit, patient's PHQ-9 was scored as a 20, and with symptoms of depression and a history of bipolar 1 disorder, she was started on Seroquel 25 mg. She does report some improvement in her symptoms including better sleep as well as feeling more relaxed in the morning and not as though she has to get a lot of things done before people leave for the day. She is still having a lot of stress in her life, and does feel that this can be improved upon. She denies any SI/HI.     Patient reports headache in her occipital and upper neck region that has been present for the past 3 days. States that it has been gradual in coming on. Reports a history of degenerative disc disease, but has not had x-rays in a long time and [place] were [place]. She reports it as a tightness or gnawing sensation. She tries taking a couple of Tylenols once a day, but is not getting relief from that. She denies any vision changes or other symptoms with it.   Tried anything in particular to help with the pain that is also in her neck for it.     Also, patient needs refill of Trelegy today. She does follow with Dr. Jaimee Wilcox. Reports she is using her rescue inhaler at least once a day. Current 1 pack/day smoker, is interested in quitting however, reports her  is a \"chain smoker \". This has made it difficult to quit when she is tried in the past.  She is not interested in smoking cessation classes at this time.      Past Medical History :  Past Medical History:   Diagnosis Date    Arthritis     Bipolar 1 disorder ([facility] [address]     Cirrhosis of liver due to hepatitis C 1986    Depression     Hepatitis C     Hypertension      Past Surgical History:   Procedure Laterality Date    APPENDECTOMY      BUNIONECTOMY      COLON SURGERY      HAMMER TOE SURGERY      HAND SURGERY      HYSTERECTOMY         Allergies :   No Known Allergies    Medication List :    Current Outpatient Medications   Medication Sig Dispense Refill    TRELEGY ELLIPTA 100-62.5-25 MCG/INH AEPB Inhale 1 puff into the lungs daily 1 each 3    QUEtiapine (SEROQUEL) 50 MG tablet Take 1 tablet by mouth at bedtime 60 tablet 3    diclofenac sodium (VOLTAREN) 1 % GEL Apply topically 2 times daily 50 g 3    amLODIPine (NORVASC) 10 MG tablet take 1 tablet by mouth once daily 90 tablet 1    valsartan (DIOVAN) 80 MG tablet take 1 tablet by mouth once daily 60 tablet 2    fluticasone (FLONASE) 50 MCG/ACT nasal spray 2 sprays by Each Nostril route daily 48 g 1    cetirizine (ZYRTEC) 10 MG tablet Take 1 tablet by mouth daily 30 tablet 5    ondansetron (ZOFRAN-ODT) 4 MG disintegrating tablet Take 1 tablet by mouth 3 times daily as needed for Nausea or Vomiting 21 tablet 0    albuterol sulfate  (90 Base) MCG/ACT inhaler Inhale 2 puffs into the lungs 4 times daily as needed for Wheezing 3 Inhaler 3    nicotine (NICODERM CQ) 14 MG/24HR Place 1 patch onto the skin daily 30 patch 0     No current facility-administered medications for this AEPB; Inhale 1 puff into the lungs daily  Dispense: 1 each; Refill: 3      Additional plan and future considerations:       Return to Office: Return in about 4 weeks (around 4/7/2022) for depression.     Carry DO Gilberto   Case discussed with Dr. Durga Najera

## 2022-03-10 NOTE — PROGRESS NOTES
77-year-old female presents for follow-up of upper respiratory infection about 2 weeks ago as well as depression. Patient also has concern of headaches today. When patient was presented in February, she had had an upper respiratory infection most likely viral in nature. She states that most symptoms have since resolved. She is still getting some dizziness with movement but states it is improving, and that she only gets some spinning sensation when she lays down or when she first sits up in the morning. Last visit, patient's PHQ-9 was scored as a 21, and with symptoms of depression and a history of bipolar 1 disorder, she was started on Seroquel 25 mg. She does report some improvement in her symptoms including better sleep as well as feeling more relaxed in the morning and not as though she has to get a lot of things done before people leave for the day. She is still having a lot of stress in her life, and does feel that this can be improved upon. She denies any SI/HI. Patient reports headache in her occipital and upper neck region that has been present for the past 3 days. States that it has been gradual in coming on. Reports a history of degenerative disc disease, but has not had x-rays in a long time and they were out of state. She reports it as a tightness or gnawing sensation. She tries taking a couple of Tylenols once a day, but is not getting relief from that. She denies any vision changes or other symptoms with it. Tried anything in particular to help with the pain that is also in her neck for it. Also, patient needs refill of Trelegy today. She does follow with Dr. Gayle Alvarado. Reports she is using her rescue inhaler at least once a day. Current 1 pack/day smoker, is interested in quitting however, reports her  is a \"chain smoker \". This has made it difficult to quit when she is tried in the past.  She is not interested in smoking cessation classes at this time.     Blood pressure 133/71, pulse 80, temperature 97.9 °F (36.6 °C), temperature source Temporal, height 5' (1.524 m), weight 111 lb (50.3 kg), last menstrual period 10/23/1990, SpO2 97 %, not currently breastfeeding. HEENT WNL     Heart regular    Lungs--crackles appreciated on the left lung base   abd non-tender      No edema      Assessment and plan:  Bipolar 1/depression-we will increase Seroquel from 25 mg to 50 mg. We will plan to follow-up in 1 month for this to see how she is doing with it. Upper respiratory infection-resolved, a few remaining symptoms continue to improve. Headache-history of degenerative disc disease in neck, pain also extending from neck. More likely to be tension headache rather than migraine headache as she is not having any neurologic symptoms with it. It CMP today to see what her kidney function is, and will do a trial of diclofenac gel. No improvement with diclofenac gel, will consider cervical x-rays. COPD-refill Trelegy today, encourage patient to follow-up with Dr. Peg Yang soon    Return to office in 1 month for depression/bipolar 1    Attending Physician Statement  I have discussed the case, including pertinent history and exam findings with the resident. I agree with the documented assessment and plan.

## 2022-03-11 ENCOUNTER — TELEPHONE (OUTPATIENT)
Dept: FAMILY MEDICINE CLINIC | Age: 67
End: 2022-03-11

## 2022-03-11 LAB
ALBUMIN SERPL-MCNC: 4.6 G/DL (ref 3.5–5.2)
ALP BLD-CCNC: 78 U/L (ref 35–104)
ALT SERPL-CCNC: 9 U/L (ref 0–32)
ANION GAP SERPL CALCULATED.3IONS-SCNC: 15 MMOL/L (ref 7–16)
AST SERPL-CCNC: 22 U/L (ref 0–31)
BILIRUB SERPL-MCNC: 0.3 MG/DL (ref 0–1.2)
BUN BLDV-MCNC: 9 MG/DL (ref 6–23)
CALCIUM SERPL-MCNC: 9.6 MG/DL (ref 8.6–10.2)
CHLORIDE BLD-SCNC: 105 MMOL/L (ref 98–107)
CO2: 22 MMOL/L (ref 22–29)
CREAT SERPL-MCNC: 0.7 MG/DL (ref 0.5–1)
GFR AFRICAN AMERICAN: >60
GFR NON-AFRICAN AMERICAN: >60 ML/MIN/1.73
GLUCOSE BLD-MCNC: 88 MG/DL (ref 74–99)
POTASSIUM SERPL-SCNC: 4.5 MMOL/L (ref 3.5–5)
SODIUM BLD-SCNC: 142 MMOL/L (ref 132–146)
TOTAL PROTEIN: 7.7 G/DL (ref 6.4–8.3)

## 2022-03-11 RX ORDER — QUETIAPINE FUMARATE 50 MG/1
50 TABLET, FILM COATED ORAL NIGHTLY
Qty: 60 TABLET | Refills: 3 | Status: SHIPPED
Start: 2022-03-11 | End: 2022-10-21

## 2022-03-11 RX ORDER — FLUTICASONE FUROATE, UMECLIDINIUM BROMIDE AND VILANTEROL TRIFENATATE 100; 62.5; 25 UG/1; UG/1; UG/1
1 POWDER RESPIRATORY (INHALATION) DAILY
Qty: 1 EACH | Refills: 3 | Status: SHIPPED
Start: 2022-03-11 | End: 2022-07-08 | Stop reason: SDUPTHER

## 2022-03-11 NOTE — TELEPHONE ENCOUNTER
Patient called office upset that her prescriptions from last appt were not called to pharmacy. Pt states she needs muscle rub, increase in Seroquel and her BP medication.

## 2022-06-21 ENCOUNTER — HOSPITAL ENCOUNTER (OUTPATIENT)
Age: 67
Discharge: HOME | End: 2022-06-21
Payer: COMMERCIAL

## 2022-06-21 LAB
INR BLD: 1.1
PROTHROMBIN TIME: 11.5 SEC (ref 9.3–12.4)

## 2022-06-21 PROCEDURE — 36415 COLL VENOUS BLD VENIPUNCTURE: CPT

## 2022-06-21 PROCEDURE — 82105 ALPHA-FETOPROTEIN SERUM: CPT

## 2022-06-21 PROCEDURE — 87522 HEPATITIS C REVRS TRNSCRPJ: CPT

## 2022-06-21 PROCEDURE — 85610 PROTHROMBIN TIME: CPT

## 2022-06-23 LAB — AFP-TUMOR MARKER: 4 NG/ML (ref 0–9)

## 2022-06-24 LAB
HCV QNT BY NAAT IU/ML: NOT DETECTED IU/ML
HCV QNT BY NAAT LOG IU/ML: NOT DETECTED LOG IU/ML
INTERPRETATION: NOT DETECTED

## 2022-07-08 DIAGNOSIS — J44.9 CHRONIC OBSTRUCTIVE PULMONARY DISEASE, UNSPECIFIED COPD TYPE (HCC): ICD-10-CM

## 2022-07-08 DIAGNOSIS — I10 PRIMARY HYPERTENSION: ICD-10-CM

## 2022-07-08 DIAGNOSIS — J43.8 OTHER EMPHYSEMA (HCC): ICD-10-CM

## 2022-07-08 DIAGNOSIS — J06.9 VIRAL URI: ICD-10-CM

## 2022-07-08 RX ORDER — FLUTICASONE FUROATE, UMECLIDINIUM BROMIDE AND VILANTEROL TRIFENATATE 100; 62.5; 25 UG/1; UG/1; UG/1
1 POWDER RESPIRATORY (INHALATION) DAILY
Qty: 1 EACH | Refills: 0 | Status: SHIPPED | OUTPATIENT
Start: 2022-07-08

## 2022-07-08 RX ORDER — FLUTICASONE PROPIONATE 50 MCG
2 SPRAY, SUSPENSION (ML) NASAL DAILY
Qty: 1 EACH | Refills: 0 | Status: SHIPPED
Start: 2022-07-08 | End: 2022-10-21

## 2022-07-08 RX ORDER — ALBUTEROL SULFATE 90 UG/1
2 AEROSOL, METERED RESPIRATORY (INHALATION) 4 TIMES DAILY PRN
Qty: 1 EACH | Refills: 0 | Status: SHIPPED | OUTPATIENT
Start: 2022-07-08

## 2022-07-08 RX ORDER — CETIRIZINE HYDROCHLORIDE 10 MG/1
10 TABLET ORAL DAILY
Qty: 30 TABLET | Refills: 0 | Status: SHIPPED | OUTPATIENT
Start: 2022-07-08

## 2022-07-08 RX ORDER — AMLODIPINE BESYLATE 10 MG/1
TABLET ORAL
Qty: 30 TABLET | Refills: 0 | Status: SHIPPED
Start: 2022-07-08 | End: 2022-08-02

## 2022-08-02 DIAGNOSIS — I10 PRIMARY HYPERTENSION: ICD-10-CM

## 2022-08-02 RX ORDER — AMLODIPINE BESYLATE 10 MG/1
TABLET ORAL
Qty: 30 TABLET | Refills: 0 | Status: SHIPPED
Start: 2022-08-02 | End: 2022-10-27 | Stop reason: SDUPTHER

## 2022-08-02 NOTE — TELEPHONE ENCOUNTER
Last Appointment:  Visit date not found  Future Appointments   Date Time Provider Ally Burns   8/23/2022 10:20 AM [facility]

## 2022-09-01 DIAGNOSIS — I10 PRIMARY HYPERTENSION: ICD-10-CM

## 2022-09-01 RX ORDER — VALSARTAN 80 MG/1
TABLET ORAL
Qty: 30 TABLET | Refills: 0 | Status: SHIPPED
Start: 2022-09-01 | End: 2022-10-21

## 2022-09-01 NOTE — TELEPHONE ENCOUNTER
Last Appointment:  3/10/2022  Future Appointments   Date Time Provider Ally Burns   10/6/2022  2:20 PM MD Fred Mendez [facility]

## 2022-10-02 DIAGNOSIS — I10 PRIMARY HYPERTENSION: ICD-10-CM

## 2022-10-03 NOTE — TELEPHONE ENCOUNTER
Last Appointment:  Visit date not found  Future Appointments   Date Time Provider Ally Burns   10/6/2022  2:20 PM MD Leo Bradley [facility]

## 2022-10-06 RX ORDER — VALSARTAN 80 MG/1
TABLET ORAL
Qty: 30 TABLET | Refills: 0 | OUTPATIENT
Start: 2022-10-06

## 2022-10-21 DIAGNOSIS — J06.9 VIRAL URI: ICD-10-CM

## 2022-10-21 DIAGNOSIS — I10 PRIMARY HYPERTENSION: ICD-10-CM

## 2022-10-21 DIAGNOSIS — F33.9 RECURRENT MAJOR DEPRESSIVE DISORDER, REMISSION STATUS UNSPECIFIED (HCC): ICD-10-CM

## 2022-10-21 RX ORDER — VALSARTAN 80 MG/1
TABLET ORAL
Qty: 30 TABLET | Refills: 0 | Status: SHIPPED | OUTPATIENT
Start: 2022-10-21

## 2022-10-21 RX ORDER — FLUTICASONE PROPIONATE 50 MCG
SPRAY, SUSPENSION (ML) NASAL
Qty: 16 G | Refills: 0 | Status: SHIPPED | OUTPATIENT
Start: 2022-10-21

## 2022-10-21 RX ORDER — QUETIAPINE FUMARATE 50 MG/1
50 TABLET, FILM COATED ORAL NIGHTLY
Qty: 30 TABLET | Refills: 0 | Status: SHIPPED | OUTPATIENT
Start: 2022-10-21

## 2022-10-25 DIAGNOSIS — I10 PRIMARY HYPERTENSION: ICD-10-CM

## 2022-10-27 RX ORDER — AMLODIPINE BESYLATE 10 MG/1
TABLET ORAL
Qty: 30 TABLET | Refills: 0 | Status: SHIPPED | OUTPATIENT
Start: 2022-10-27

## 2023-03-22 ENCOUNTER — HOSPITAL ENCOUNTER (OUTPATIENT)
Age: 68
Discharge: HOME | End: 2023-03-22
Payer: MEDICAID

## 2023-03-22 LAB
ALBUMIN SERPL-MCNC: 4.5 G/DL (ref 3.5–5.2)
ALP SERPL-CCNC: 90 U/L (ref 35–104)
ALT SERPL-CCNC: 10 U/L (ref 0–32)
AMMONIA PLAS-SCNC: 28 UMOL/L (ref 11–51)
ANION GAP SERPL CALCULATED.3IONS-SCNC: 11 MMOL/L (ref 7–16)
APTT BLD: 33.7 SEC (ref 24.5–35.1)
AST SERPL-CCNC: 16 U/L (ref 0–31)
BASOPHILS # BLD: 0.05 E9/L (ref 0–0.2)
BASOPHILS NFR BLD: 0.6 % (ref 0–2)
BILIRUB SERPL-MCNC: 0.4 MG/DL (ref 0–1.2)
BUN SERPL-MCNC: 9 MG/DL (ref 6–23)
CALCIUM SERPL-MCNC: 9.3 MG/DL (ref 8.6–10.2)
CHLORIDE SERPL-SCNC: 105 MMOL/L (ref 98–107)
CO2 SERPL-SCNC: 26 MMOL/L (ref 22–29)
CREAT SERPL-MCNC: 0.8 MG/DL (ref 0.5–1)
EOSINOPHIL # BLD: 0.07 E9/L (ref 0.05–0.5)
EOSINOPHIL NFR BLD: 0.9 % (ref 0–6)
ERYTHROCYTE [DISTWIDTH] IN BLOOD BY AUTOMATED COUNT: 12.1 FL (ref 11.5–15)
GLUCOSE SERPL-MCNC: 99 MG/DL (ref 74–99)
HCT VFR BLD AUTO: 41 % (ref 34–48)
HGB BLD-MCNC: 13.4 G/DL (ref 11.5–15.5)
IMM GRANULOCYTES # BLD: 0.04 E9/L
IMM GRANULOCYTES NFR BLD: 0.5 % (ref 0–5)
INR BLD: 1.1
LYMPHOCYTES # BLD: 2.18 E9/L (ref 1.5–4)
LYMPHOCYTES NFR BLD: 26.7 % (ref 20–42)
MCH RBC QN AUTO: 30.6 PG (ref 26–35)
MCHC RBC AUTO-ENTMCNC: 32.7 % (ref 32–34.5)
MCV RBC AUTO: 93.6 FL (ref 80–99.9)
MONOCYTES # BLD: 0.46 E9/L (ref 0.1–0.95)
MONOCYTES NFR BLD: 5.6 % (ref 2–12)
NEUTROPHILS # BLD: 5.36 E9/L (ref 1.8–7.3)
NEUTS SEG NFR BLD: 65.7 % (ref 43–80)
PLATELET # BLD AUTO: 292 E9/L (ref 130–450)
PMV BLD AUTO: 10.4 FL (ref 7–12)
POTASSIUM SERPL-SCNC: 3.5 MMOL/L (ref 3.5–5)
PROT SERPL-MCNC: 7.6 G/DL (ref 6.4–8.3)
PROTHROMBIN TIME: 11.5 SEC (ref 9.3–12.4)
RBC # BLD AUTO: 4.38 E12/L (ref 3.5–5.5)
SODIUM SERPL-SCNC: 142 MMOL/L (ref 132–146)
WBC # BLD: 8.2 E9/L (ref 4.5–11.5)

## 2023-03-22 PROCEDURE — 80053 COMPREHEN METABOLIC PANEL: CPT

## 2023-03-22 PROCEDURE — 82105 ALPHA-FETOPROTEIN SERUM: CPT

## 2023-03-22 PROCEDURE — 85730 THROMBOPLASTIN TIME PARTIAL: CPT

## 2023-03-22 PROCEDURE — 82140 ASSAY OF AMMONIA: CPT

## 2023-03-22 PROCEDURE — 85610 PROTHROMBIN TIME: CPT

## 2023-03-22 PROCEDURE — 36415 COLL VENOUS BLD VENIPUNCTURE: CPT

## 2023-03-22 PROCEDURE — 85025 COMPLETE CBC W/AUTO DIFF WBC: CPT

## 2023-03-24 LAB — AFP-TM SERPL-MCNC: 4 NG/ML (ref 0–9)

## 2023-04-11 PROBLEM — K62.3 RECTAL PROLAPSE: Status: ACTIVE | Noted: 2023-04-11

## 2023-04-17 ENCOUNTER — HOSPITAL ENCOUNTER (OUTPATIENT)
Dept: PREADMISSION TESTING | Age: 68
Discharge: HOME | End: 2023-04-17
Payer: MEDICARE

## 2023-04-17 VITALS
HEART RATE: 83 BPM | WEIGHT: 107 LBS | TEMPERATURE: 97.9 F | SYSTOLIC BLOOD PRESSURE: 124 MMHG | OXYGEN SATURATION: 100 % | BODY MASS INDEX: 19.69 KG/M2 | RESPIRATION RATE: 14 BRPM | DIASTOLIC BLOOD PRESSURE: 75 MMHG | HEIGHT: 62 IN

## 2023-04-17 DIAGNOSIS — Z01.818 PREOP TESTING: Primary | ICD-10-CM

## 2023-04-17 LAB
ALBUMIN SERPL-MCNC: 4.5 G/DL (ref 3.5–5.2)
ALP SERPL-CCNC: 79 U/L (ref 35–104)
ALT SERPL-CCNC: 10 U/L (ref 0–32)
ANION GAP SERPL CALCULATED.3IONS-SCNC: 10 MMOL/L (ref 7–16)
AST SERPL-CCNC: 14 U/L (ref 0–31)
BILIRUB SERPL-MCNC: 0.4 MG/DL (ref 0–1.2)
BUN SERPL-MCNC: 13 MG/DL (ref 6–23)
CALCIUM SERPL-MCNC: 8.6 MG/DL (ref 8.6–10.2)
CHLORIDE SERPL-SCNC: 108 MMOL/L (ref 98–107)
CO2 SERPL-SCNC: 28 MMOL/L (ref 22–29)
CREAT SERPL-MCNC: 0.8 MG/DL (ref 0.5–1)
EKG ATRIAL RATE: 80 BPM
EKG P AXIS: 54 DEGREES
EKG P-R INTERVAL: 168 MS
EKG Q-T INTERVAL: 424 MS
EKG QRS DURATION: 94 MS
EKG QTC CALCULATION (BAZETT): 489 MS
EKG R AXIS: -18 DEGREES
EKG T AXIS: 79 DEGREES
EKG VENTRICULAR RATE: 80 BPM
ERYTHROCYTE [DISTWIDTH] IN BLOOD BY AUTOMATED COUNT: 12.2 FL (ref 11.5–15)
GLUCOSE SERPL-MCNC: 83 MG/DL (ref 74–99)
HCT VFR BLD AUTO: 39.1 % (ref 34–48)
HGB BLD-MCNC: 13.2 G/DL (ref 11.5–15.5)
MCH RBC QN AUTO: 31.1 PG (ref 26–35)
MCHC RBC AUTO-ENTMCNC: 33.8 % (ref 32–34.5)
MCV RBC AUTO: 92.2 FL (ref 80–99.9)
PLATELET # BLD AUTO: 290 E9/L (ref 130–450)
PMV BLD AUTO: 10.6 FL (ref 7–12)
POTASSIUM SERPL-SCNC: 3.9 MMOL/L (ref 3.5–5)
PROT SERPL-MCNC: 7.2 G/DL (ref 6.4–8.3)
RBC # BLD AUTO: 4.24 E12/L (ref 3.5–5.5)
SODIUM SERPL-SCNC: 146 MMOL/L (ref 132–146)
WBC # BLD: 6.5 E9/L (ref 4.5–11.5)

## 2023-04-17 PROCEDURE — 93005 ELECTROCARDIOGRAM TRACING: CPT | Performed by: ANESTHESIOLOGY

## 2023-04-17 PROCEDURE — 87081 CULTURE SCREEN ONLY: CPT

## 2023-04-17 PROCEDURE — 36415 COLL VENOUS BLD VENIPUNCTURE: CPT

## 2023-04-17 PROCEDURE — 85027 COMPLETE CBC AUTOMATED: CPT

## 2023-04-17 PROCEDURE — 80053 COMPREHEN METABOLIC PANEL: CPT

## 2023-04-17 NOTE — PROGRESS NOTES
Spoke with Dr. Trish Lopez anesthesiologist regarding today's EKG, requested to have pts PCP review. EKG faxed to Dr. Drake Burgess, confirmation received. Attempted to call office without answer, message left, waiting for response.

## 2023-04-17 NOTE — PROGRESS NOTES
[facility]                                                                                                                    PRE OP INSTRUCTIONS FOR  Sharron Ann        Date: 4/17/2023    Date of surgery: 4/19/23       Arrival Time: Hospital will call you between 5pm and 7pm with your final arrival time for surgery    Do not eat or drink anything after midnight prior to surgery. This includes no water, chewing gum, mints or ice chips. Take the following medications with a small sip of water on the morning of Surgery: amlodipine, gabapentin, zofran if needed, ultram if needed, trelegy, albuterol (please bring day of surgery), flonase      Diabetics may take evening dose of insulin but none after midnight. If you feel symptomatic or low blood sugar morning of surgery drink 1-2 ounces of apple juice only. Aspirin, Ibuprofen, Advil, Naproxen, Vitamin E and other Anti-inflammatory products should be stopped  before surgery  as directed by your physician. Take Tylenol only unless instructed otherwise by your surgeon. Check with your Doctor regarding stopping Plavix, Coumadin, Lovenox, Eliquis, Effient, or other blood thinners. Do not smoke,use illicit drugs and do not drink any alcoholic beverages 24 hours prior to surgery. You may brush your teeth the morning of surgery. DO NOT SWALLOW WATER    You MUST make arrangements for a responsible adult to take you home after your surgery. You will not be allowed to leave alone or drive yourself home. It is strongly suggested someone stay with you the first 24 hrs. Your surgery will be cancelled if you do not have a ride home. Please wear simple, loose fitting clothing to the hospital.  Kvng Singh not bring valuables (money, credit cards, checkbooks, etc.) Do not wear any makeup (including no eye makeup) or nail polish on your fingers or toes. DO NOT wear any jewelry or piercings on day of surgery.   All body piercing jewelry

## 2023-04-18 LAB — MRSA SPEC QL CULT: NORMAL

## 2023-04-19 ENCOUNTER — ANESTHESIA EVENT (OUTPATIENT)
Dept: OPERATING ROOM | Age: 68
End: 2023-04-19
Payer: MEDICARE

## 2023-04-19 ENCOUNTER — ANESTHESIA (OUTPATIENT)
Dept: OPERATING ROOM | Age: 68
End: 2023-04-19
Payer: MEDICARE

## 2023-04-19 ENCOUNTER — HOSPITAL ENCOUNTER (INPATIENT)
Age: 68
LOS: 1 days | Discharge: LEFT BEFORE DISCHARGE | DRG: 331 | End: 2023-04-20
Attending: SURGERY | Admitting: SURGERY
Payer: MEDICARE

## 2023-04-19 DIAGNOSIS — K62.3 RECTAL PROLAPSE: ICD-10-CM

## 2023-04-19 PROBLEM — Z90.49 S/P COLECTOMY: Status: ACTIVE | Noted: 2023-04-19

## 2023-04-19 PROCEDURE — 6370000000 HC RX 637 (ALT 250 FOR IP)

## 2023-04-19 PROCEDURE — 2720000010 HC SURG SUPPLY STERILE: Performed by: SURGERY

## 2023-04-19 PROCEDURE — 2500000003 HC RX 250 WO HCPCS: Performed by: SURGERY

## 2023-04-19 PROCEDURE — 2709999900 HC NON-CHARGEABLE SUPPLY: Performed by: SURGERY

## 2023-04-19 PROCEDURE — 7100000000 HC PACU RECOVERY - FIRST 15 MIN: Performed by: SURGERY

## 2023-04-19 PROCEDURE — 2500000003 HC RX 250 WO HCPCS: Performed by: NURSE ANESTHETIST, CERTIFIED REGISTERED

## 2023-04-19 PROCEDURE — 6360000002 HC RX W HCPCS: Performed by: SURGERY

## 2023-04-19 PROCEDURE — 3700000001 HC ADD 15 MINUTES (ANESTHESIA): Performed by: SURGERY

## 2023-04-19 PROCEDURE — 0DTN4ZZ RESECTION OF SIGMOID COLON, PERCUTANEOUS ENDOSCOPIC APPROACH: ICD-10-PCS | Performed by: SURGERY

## 2023-04-19 PROCEDURE — 2580000003 HC RX 258: Performed by: NURSE ANESTHETIST, CERTIFIED REGISTERED

## 2023-04-19 PROCEDURE — 7100000001 HC PACU RECOVERY - ADDTL 15 MIN: Performed by: SURGERY

## 2023-04-19 PROCEDURE — 88307 TISSUE EXAM BY PATHOLOGIST: CPT

## 2023-04-19 PROCEDURE — 3600000014 HC SURGERY LEVEL 4 ADDTL 15MIN: Performed by: SURGERY

## 2023-04-19 PROCEDURE — 99024 POSTOP FOLLOW-UP VISIT: CPT | Performed by: SURGERY

## 2023-04-19 PROCEDURE — 1200000000 HC SEMI PRIVATE

## 2023-04-19 PROCEDURE — 3600000004 HC SURGERY LEVEL 4 BASE: Performed by: SURGERY

## 2023-04-19 PROCEDURE — 6360000002 HC RX W HCPCS: Performed by: NURSE ANESTHETIST, CERTIFIED REGISTERED

## 2023-04-19 PROCEDURE — 6370000000 HC RX 637 (ALT 250 FOR IP): Performed by: SURGERY

## 2023-04-19 PROCEDURE — 99222 1ST HOSP IP/OBS MODERATE 55: CPT | Performed by: INTERNAL MEDICINE

## 2023-04-19 PROCEDURE — 6360000002 HC RX W HCPCS

## 2023-04-19 PROCEDURE — 2580000003 HC RX 258: Performed by: SURGERY

## 2023-04-19 PROCEDURE — 0DQP4ZZ REPAIR RECTUM, PERCUTANEOUS ENDOSCOPIC APPROACH: ICD-10-PCS | Performed by: SURGERY

## 2023-04-19 PROCEDURE — 3700000000 HC ANESTHESIA ATTENDED CARE: Performed by: SURGERY

## 2023-04-19 PROCEDURE — 45402 LAP PROCTOPEXY W/SIG RESECT: CPT | Performed by: SURGERY

## 2023-04-19 RX ORDER — SODIUM CHLORIDE 9 MG/ML
INJECTION, SOLUTION INTRAVENOUS PRN
Status: DISCONTINUED | OUTPATIENT
Start: 2023-04-19 | End: 2023-04-20 | Stop reason: HOSPADM

## 2023-04-19 RX ORDER — ENOXAPARIN SODIUM 100 MG/ML
30 INJECTION SUBCUTANEOUS DAILY
Status: DISCONTINUED | OUTPATIENT
Start: 2023-04-19 | End: 2023-04-20 | Stop reason: HOSPADM

## 2023-04-19 RX ORDER — METHOCARBAMOL 500 MG/1
500 TABLET, FILM COATED ORAL 4 TIMES DAILY
Status: DISCONTINUED | OUTPATIENT
Start: 2023-04-19 | End: 2023-04-20 | Stop reason: HOSPADM

## 2023-04-19 RX ORDER — TRAMADOL HYDROCHLORIDE 50 MG/1
50 TABLET ORAL EVERY 6 HOURS PRN
Status: DISCONTINUED | OUTPATIENT
Start: 2023-04-19 | End: 2023-04-20 | Stop reason: HOSPADM

## 2023-04-19 RX ORDER — TRAMADOL HYDROCHLORIDE 50 MG/1
25 TABLET ORAL EVERY 6 HOURS PRN
Status: DISCONTINUED | OUTPATIENT
Start: 2023-04-19 | End: 2023-04-20 | Stop reason: HOSPADM

## 2023-04-19 RX ORDER — ONDANSETRON 2 MG/ML
4 INJECTION INTRAMUSCULAR; INTRAVENOUS
Status: DISCONTINUED | OUTPATIENT
Start: 2023-04-19 | End: 2023-04-19 | Stop reason: HOSPADM

## 2023-04-19 RX ORDER — HYDROMORPHONE HYDROCHLORIDE 1 MG/ML
0.25 INJECTION, SOLUTION INTRAMUSCULAR; INTRAVENOUS; SUBCUTANEOUS EVERY 5 MIN PRN
Status: DISCONTINUED | OUTPATIENT
Start: 2023-04-19 | End: 2023-04-19 | Stop reason: HOSPADM

## 2023-04-19 RX ORDER — ONDANSETRON 2 MG/ML
4 INJECTION INTRAMUSCULAR; INTRAVENOUS EVERY 6 HOURS PRN
Status: DISCONTINUED | OUTPATIENT
Start: 2023-04-19 | End: 2023-04-20 | Stop reason: HOSPADM

## 2023-04-19 RX ORDER — MORPHINE SULFATE 2 MG/ML
2 INJECTION, SOLUTION INTRAMUSCULAR; INTRAVENOUS
Status: DISCONTINUED | OUTPATIENT
Start: 2023-04-19 | End: 2023-04-20 | Stop reason: HOSPADM

## 2023-04-19 RX ORDER — SODIUM CHLORIDE 9 MG/ML
INJECTION, SOLUTION INTRAVENOUS PRN
Status: DISCONTINUED | OUTPATIENT
Start: 2023-04-19 | End: 2023-04-19 | Stop reason: HOSPADM

## 2023-04-19 RX ORDER — KETOROLAC TROMETHAMINE 15 MG/ML
15 INJECTION, SOLUTION INTRAMUSCULAR; INTRAVENOUS EVERY 6 HOURS
Status: DISCONTINUED | OUTPATIENT
Start: 2023-04-19 | End: 2023-04-20 | Stop reason: HOSPADM

## 2023-04-19 RX ORDER — FENTANYL CITRATE 50 UG/ML
INJECTION, SOLUTION INTRAMUSCULAR; INTRAVENOUS PRN
Status: DISCONTINUED | OUTPATIENT
Start: 2023-04-19 | End: 2023-04-19 | Stop reason: SDUPTHER

## 2023-04-19 RX ORDER — SODIUM CHLORIDE 0.9 % (FLUSH) 0.9 %
5-40 SYRINGE (ML) INJECTION EVERY 12 HOURS SCHEDULED
Status: DISCONTINUED | OUTPATIENT
Start: 2023-04-19 | End: 2023-04-19 | Stop reason: HOSPADM

## 2023-04-19 RX ORDER — BUPIVACAINE HYDROCHLORIDE AND EPINEPHRINE 2.5; 5 MG/ML; UG/ML
INJECTION, SOLUTION EPIDURAL; INFILTRATION; INTRACAUDAL; PERINEURAL PRN
Status: DISCONTINUED | OUTPATIENT
Start: 2023-04-19 | End: 2023-04-19 | Stop reason: ALTCHOICE

## 2023-04-19 RX ORDER — SODIUM CHLORIDE 0.9 % (FLUSH) 0.9 %
5-40 SYRINGE (ML) INJECTION PRN
Status: DISCONTINUED | OUTPATIENT
Start: 2023-04-19 | End: 2023-04-19 | Stop reason: HOSPADM

## 2023-04-19 RX ORDER — MIDAZOLAM HYDROCHLORIDE 1 MG/ML
1 INJECTION INTRAMUSCULAR; INTRAVENOUS
Status: DISCONTINUED | OUTPATIENT
Start: 2023-04-19 | End: 2023-04-19 | Stop reason: HOSPADM

## 2023-04-19 RX ORDER — IPRATROPIUM BROMIDE AND ALBUTEROL SULFATE 2.5; .5 MG/3ML; MG/3ML
SOLUTION RESPIRATORY (INHALATION)
Status: COMPLETED
Start: 2023-04-19 | End: 2023-04-19

## 2023-04-19 RX ORDER — METHOCARBAMOL 100 MG/ML
1000 INJECTION, SOLUTION INTRAMUSCULAR; INTRAVENOUS ONCE
Status: COMPLETED | OUTPATIENT
Start: 2023-04-19 | End: 2023-04-19

## 2023-04-19 RX ORDER — ROCURONIUM BROMIDE 10 MG/ML
INJECTION, SOLUTION INTRAVENOUS PRN
Status: DISCONTINUED | OUTPATIENT
Start: 2023-04-19 | End: 2023-04-19 | Stop reason: SDUPTHER

## 2023-04-19 RX ORDER — ONDANSETRON 4 MG/1
4 TABLET, ORALLY DISINTEGRATING ORAL EVERY 8 HOURS PRN
Status: DISCONTINUED | OUTPATIENT
Start: 2023-04-19 | End: 2023-04-20 | Stop reason: HOSPADM

## 2023-04-19 RX ORDER — NEOSTIGMINE METHYLSULFATE 1 MG/ML
INJECTION, SOLUTION INTRAVENOUS PRN
Status: DISCONTINUED | OUTPATIENT
Start: 2023-04-19 | End: 2023-04-19 | Stop reason: SDUPTHER

## 2023-04-19 RX ORDER — METRONIDAZOLE 500 MG/100ML
500 INJECTION, SOLUTION INTRAVENOUS
Status: COMPLETED | OUTPATIENT
Start: 2023-04-19 | End: 2023-04-19

## 2023-04-19 RX ORDER — SODIUM CHLORIDE 0.9 % (FLUSH) 0.9 %
5-40 SYRINGE (ML) INJECTION PRN
Status: DISCONTINUED | OUTPATIENT
Start: 2023-04-19 | End: 2023-04-20 | Stop reason: HOSPADM

## 2023-04-19 RX ORDER — SODIUM CHLORIDE 0.9 % (FLUSH) 0.9 %
5-40 SYRINGE (ML) INJECTION EVERY 12 HOURS SCHEDULED
Status: DISCONTINUED | OUTPATIENT
Start: 2023-04-19 | End: 2023-04-20 | Stop reason: HOSPADM

## 2023-04-19 RX ORDER — METRONIDAZOLE 500 MG/100ML
500 INJECTION, SOLUTION INTRAVENOUS EVERY 8 HOURS
Status: COMPLETED | OUTPATIENT
Start: 2023-04-19 | End: 2023-04-20

## 2023-04-19 RX ORDER — SODIUM CHLORIDE, SODIUM LACTATE, POTASSIUM CHLORIDE, CALCIUM CHLORIDE 600; 310; 30; 20 MG/100ML; MG/100ML; MG/100ML; MG/100ML
INJECTION, SOLUTION INTRAVENOUS CONTINUOUS
Status: DISCONTINUED | OUTPATIENT
Start: 2023-04-19 | End: 2023-04-20 | Stop reason: HOSPADM

## 2023-04-19 RX ORDER — HYDROMORPHONE HYDROCHLORIDE 1 MG/ML
0.5 INJECTION, SOLUTION INTRAMUSCULAR; INTRAVENOUS; SUBCUTANEOUS EVERY 5 MIN PRN
Status: COMPLETED | OUTPATIENT
Start: 2023-04-19 | End: 2023-04-19

## 2023-04-19 RX ORDER — KETOROLAC TROMETHAMINE 30 MG/ML
INJECTION, SOLUTION INTRAMUSCULAR; INTRAVENOUS PRN
Status: DISCONTINUED | OUTPATIENT
Start: 2023-04-19 | End: 2023-04-19 | Stop reason: SDUPTHER

## 2023-04-19 RX ORDER — PROPOFOL 10 MG/ML
INJECTION, EMULSION INTRAVENOUS PRN
Status: DISCONTINUED | OUTPATIENT
Start: 2023-04-19 | End: 2023-04-19 | Stop reason: SDUPTHER

## 2023-04-19 RX ORDER — DEXAMETHASONE SODIUM PHOSPHATE 10 MG/ML
INJECTION, SOLUTION INTRAMUSCULAR; INTRAVENOUS PRN
Status: DISCONTINUED | OUTPATIENT
Start: 2023-04-19 | End: 2023-04-19 | Stop reason: SDUPTHER

## 2023-04-19 RX ORDER — MIDAZOLAM HYDROCHLORIDE 1 MG/ML
INJECTION INTRAMUSCULAR; INTRAVENOUS PRN
Status: DISCONTINUED | OUTPATIENT
Start: 2023-04-19 | End: 2023-04-19 | Stop reason: SDUPTHER

## 2023-04-19 RX ORDER — SODIUM CHLORIDE, SODIUM LACTATE, POTASSIUM CHLORIDE, CALCIUM CHLORIDE 600; 310; 30; 20 MG/100ML; MG/100ML; MG/100ML; MG/100ML
INJECTION, SOLUTION INTRAVENOUS CONTINUOUS
Status: DISCONTINUED | OUTPATIENT
Start: 2023-04-19 | End: 2023-04-19 | Stop reason: HOSPADM

## 2023-04-19 RX ORDER — SODIUM CHLORIDE, SODIUM LACTATE, POTASSIUM CHLORIDE, CALCIUM CHLORIDE 600; 310; 30; 20 MG/100ML; MG/100ML; MG/100ML; MG/100ML
INJECTION, SOLUTION INTRAVENOUS CONTINUOUS PRN
Status: DISCONTINUED | OUTPATIENT
Start: 2023-04-19 | End: 2023-04-19 | Stop reason: SDUPTHER

## 2023-04-19 RX ORDER — GLYCOPYRROLATE 0.2 MG/ML
INJECTION INTRAMUSCULAR; INTRAVENOUS PRN
Status: DISCONTINUED | OUTPATIENT
Start: 2023-04-19 | End: 2023-04-19 | Stop reason: SDUPTHER

## 2023-04-19 RX ORDER — MORPHINE SULFATE 4 MG/ML
4 INJECTION, SOLUTION INTRAMUSCULAR; INTRAVENOUS
Status: DISCONTINUED | OUTPATIENT
Start: 2023-04-19 | End: 2023-04-20 | Stop reason: HOSPADM

## 2023-04-19 RX ORDER — METHOCARBAMOL 100 MG/ML
INJECTION, SOLUTION INTRAMUSCULAR; INTRAVENOUS
Status: COMPLETED
Start: 2023-04-19 | End: 2023-04-19

## 2023-04-19 RX ORDER — ONDANSETRON 2 MG/ML
INJECTION INTRAMUSCULAR; INTRAVENOUS PRN
Status: DISCONTINUED | OUTPATIENT
Start: 2023-04-19 | End: 2023-04-19 | Stop reason: SDUPTHER

## 2023-04-19 RX ORDER — LIDOCAINE HYDROCHLORIDE 20 MG/ML
INJECTION, SOLUTION INTRAVENOUS PRN
Status: DISCONTINUED | OUTPATIENT
Start: 2023-04-19 | End: 2023-04-19 | Stop reason: SDUPTHER

## 2023-04-19 RX ORDER — MORPHINE SULFATE 1 MG/ML
INJECTION, SOLUTION EPIDURAL; INTRATHECAL; INTRAVENOUS PRN
Status: DISCONTINUED | OUTPATIENT
Start: 2023-04-19 | End: 2023-04-19 | Stop reason: SDUPTHER

## 2023-04-19 RX ADMIN — MIDAZOLAM 2 MG: 1 INJECTION INTRAMUSCULAR; INTRAVENOUS at 09:13

## 2023-04-19 RX ADMIN — FENTANYL CITRATE 50 MCG: 50 INJECTION, SOLUTION INTRAMUSCULAR; INTRAVENOUS at 09:52

## 2023-04-19 RX ADMIN — SODIUM CHLORIDE, POTASSIUM CHLORIDE, SODIUM LACTATE AND CALCIUM CHLORIDE: 600; 310; 30; 20 INJECTION, SOLUTION INTRAVENOUS at 09:09

## 2023-04-19 RX ADMIN — SODIUM CHLORIDE, POTASSIUM CHLORIDE, SODIUM LACTATE AND CALCIUM CHLORIDE: 600; 310; 30; 20 INJECTION, SOLUTION INTRAVENOUS at 08:12

## 2023-04-19 RX ADMIN — METHOCARBAMOL 1000 MG: 100 INJECTION, SOLUTION INTRAMUSCULAR; INTRAVENOUS at 11:20

## 2023-04-19 RX ADMIN — WATER 1000 MG: 1 INJECTION INTRAMUSCULAR; INTRAVENOUS; SUBCUTANEOUS at 09:35

## 2023-04-19 RX ADMIN — METRONIDAZOLE 500 MG: 500 INJECTION, SOLUTION INTRAVENOUS at 09:13

## 2023-04-19 RX ADMIN — GLYCOPYRROLATE 0.4 MG: 0.2 INJECTION, SOLUTION INTRAMUSCULAR; INTRAVENOUS at 10:49

## 2023-04-19 RX ADMIN — KETOROLAC TROMETHAMINE 30 MG: 30 INJECTION, SOLUTION INTRAMUSCULAR at 10:53

## 2023-04-19 RX ADMIN — ONDANSETRON 4 MG: 2 INJECTION INTRAMUSCULAR; INTRAVENOUS at 10:29

## 2023-04-19 RX ADMIN — MORPHINE SULFATE 4 MG: 1 INJECTION, SOLUTION EPIDURAL; INTRATHECAL; INTRAVENOUS at 11:03

## 2023-04-19 RX ADMIN — LIDOCAINE HYDROCHLORIDE 50 MG: 20 INJECTION, SOLUTION INTRAVENOUS at 09:21

## 2023-04-19 RX ADMIN — HYDROMORPHONE HYDROCHLORIDE 0.5 MG: 1 INJECTION, SOLUTION INTRAMUSCULAR; INTRAVENOUS; SUBCUTANEOUS at 11:38

## 2023-04-19 RX ADMIN — MORPHINE SULFATE 2 MG: 1 INJECTION, SOLUTION EPIDURAL; INTRATHECAL; INTRAVENOUS at 11:09

## 2023-04-19 RX ADMIN — METRONIDAZOLE 500 MG: 500 INJECTION, SOLUTION INTRAVENOUS at 17:37

## 2023-04-19 RX ADMIN — ONDANSETRON 4 MG: 2 INJECTION INTRAMUSCULAR; INTRAVENOUS at 17:41

## 2023-04-19 RX ADMIN — SODIUM CHLORIDE, POTASSIUM CHLORIDE, SODIUM LACTATE AND CALCIUM CHLORIDE: 600; 310; 30; 20 INJECTION, SOLUTION INTRAVENOUS at 10:17

## 2023-04-19 RX ADMIN — FENTANYL CITRATE 100 MCG: 50 INJECTION, SOLUTION INTRAMUSCULAR; INTRAVENOUS at 10:02

## 2023-04-19 RX ADMIN — HYDROMORPHONE HYDROCHLORIDE 0.5 MG: 1 INJECTION, SOLUTION INTRAMUSCULAR; INTRAVENOUS; SUBCUTANEOUS at 11:25

## 2023-04-19 RX ADMIN — IPRATROPIUM BROMIDE AND ALBUTEROL SULFATE 3 ML: .5; 3 SOLUTION RESPIRATORY (INHALATION) at 11:15

## 2023-04-19 RX ADMIN — ROCURONIUM BROMIDE 40 MG: 100 INJECTION, SOLUTION INTRAVENOUS at 09:21

## 2023-04-19 RX ADMIN — FENTANYL CITRATE 100 MCG: 50 INJECTION, SOLUTION INTRAMUSCULAR; INTRAVENOUS at 09:21

## 2023-04-19 RX ADMIN — DEXAMETHASONE SODIUM PHOSPHATE 10 MG: 10 INJECTION, SOLUTION INTRAMUSCULAR; INTRAVENOUS at 09:27

## 2023-04-19 RX ADMIN — PROPOFOL 100 MG: 10 INJECTION, EMULSION INTRAVENOUS at 09:21

## 2023-04-19 RX ADMIN — Medication 3 MG: at 10:49

## 2023-04-19 RX ADMIN — MORPHINE SULFATE 4 MG: 1 INJECTION, SOLUTION EPIDURAL; INTRATHECAL; INTRAVENOUS at 10:56

## 2023-04-19 RX ADMIN — KETOROLAC TROMETHAMINE 15 MG: 15 INJECTION, SOLUTION INTRAMUSCULAR; INTRAVENOUS at 17:36

## 2023-04-19 RX ADMIN — FENTANYL CITRATE 50 MCG: 50 INJECTION, SOLUTION INTRAMUSCULAR; INTRAVENOUS at 09:41

## 2023-04-19 ASSESSMENT — PAIN SCALES - GENERAL
PAINLEVEL_OUTOF10: 9
PAINLEVEL_OUTOF10: 8
PAINLEVEL_OUTOF10: 6
PAINLEVEL_OUTOF10: 9
PAINLEVEL_OUTOF10: 3

## 2023-04-19 ASSESSMENT — LIFESTYLE VARIABLES
SMOKING_STATUS: 1
HOW OFTEN DO YOU HAVE A DRINK CONTAINING ALCOHOL: MONTHLY OR LESS
HOW MANY STANDARD DRINKS CONTAINING ALCOHOL DO YOU HAVE ON A TYPICAL DAY: 1 OR 2

## 2023-04-19 ASSESSMENT — PAIN DESCRIPTION - DESCRIPTORS
DESCRIPTORS: ACHING;DISCOMFORT
DESCRIPTORS: CRAMPING;ACHING

## 2023-04-19 ASSESSMENT — PAIN DESCRIPTION - LOCATION
LOCATION: ABDOMEN
LOCATION: ABDOMEN

## 2023-04-19 ASSESSMENT — PAIN DESCRIPTION - PAIN TYPE
TYPE: SURGICAL PAIN
TYPE: SURGICAL PAIN

## 2023-04-19 ASSESSMENT — PAIN DESCRIPTION - FREQUENCY
FREQUENCY: CONTINUOUS
FREQUENCY: INTERMITTENT

## 2023-04-19 ASSESSMENT — PAIN DESCRIPTION - ORIENTATION: ORIENTATION: RIGHT

## 2023-04-19 ASSESSMENT — PAIN DESCRIPTION - ONSET: ONSET: GRADUAL

## 2023-04-19 ASSESSMENT — PAIN - FUNCTIONAL ASSESSMENT
PAIN_FUNCTIONAL_ASSESSMENT: PREVENTS OR INTERFERES SOME ACTIVE ACTIVITIES AND ADLS
PAIN_FUNCTIONAL_ASSESSMENT: NONE - DENIES PAIN
PAIN_FUNCTIONAL_ASSESSMENT: ACTIVITIES ARE NOT PREVENTED

## 2023-04-19 NOTE — ANESTHESIA PRE PROCEDURE
100 mL IVPB premix  500 mg IntraVENous On Call to [address] [place]        lactated ringers IV soln infusion   IntraVENous Continuous Altaf Hoffmann MD        sodium chloride flush 0.9 % injection 5-40 mL  5-40 mL IntraVENous 2 times per day Altaf Hoffmann MD        sodium chloride flush 0.9 % injection 5-40 mL  5-40 mL IntraVENous PRN Altaf Hoffmann MD        0.9 % sodium chloride infusion   IntraVENous PRN Altaf Hoffmann MD           Allergies:  No Known Allergies    Problem List:    Patient Active Problem List   Diagnosis Code    Depression, major, recurrent ([facility] [address] F33.9    Hypertension I10    Viral hepatitis B with hepatitis delta B19.10    Addiction, marijuana ([facility] [address] F12.20    Bipolar 1 disorder ([facility] [address] F31.9    Cirrhosis of liver due to hepatitis C B18.2, K74.60    GERD (gastroesophageal reflux disease) K21.9    Substance abuse ([facility] [address] F19.10    Chronic obstructive pulmonary disease ([facility] [address] J44.9    Rectal prolapse K62.3       Past Medical History:        Diagnosis Date    Arthritis     Bipolar 1 disorder ([facility] [address]     Cirrhosis of liver due to hepatitis C 1986    COPD (chronic obstructive pulmonary disease) ([facility] [address]     Depression     Hepatitis C     Hypertension        Past Surgical History:        Procedure Laterality Date    APPENDECTOMY      BUNIONECTOMY      COLON SURGERY      HAMMER TOE SURGERY      HAND SURGERY      HYSTERECTOMY (CERVIX STATUS UNKNOWN)         Social History:    Social History     Tobacco Use    Smoking status: Every Day     Packs/day: 1.00     Years: 16.00     Pack years: 16.00     Types: Cigarettes    Smokeless tobacco: Never   Substance Use Topics    Alcohol use: Not Currently                                Ready to quit: Not Answered  Counseling given: Not Answered      Vital Signs (Current):   Vitals:    04/19/23 0756   BP: 114/68   Pulse: 75   Resp: 16   Temp: 36.7 °C (98 °F)   TempSrc: Infrared   SpO2: 95%   Weight: 107 lb (48.5 kg)   Height: 5' 2\"

## 2023-04-19 NOTE — ANESTHESIA POSTPROCEDURE EVALUATION
Department of Anesthesiology  Postprocedure Note    Patient: Caridad Linda  MRN: [id number]  YOB: 1955  Date of evaluation: 4/19/2023      Procedure Summary     Date: 04/19/23 Room / Location: [address] [place] [address] 03 / [facility]    Anesthesia Start: 7743 Anesthesia Stop: 9132    Procedure: LAPAROSCOPIC  SIGMOID COLON RESECTION WITH RECTOPEXY (Abdomen) Diagnosis:       Rectal prolapse      (Rectal prolapse [K62.3])    Surgeons: Goran Stark MD Responsible Provider: Shannon Gallegos MD    Anesthesia Type: general ASA Status: 3          Anesthesia Type: No value filed.     Leonard Phase I: Leonard Score: 8    Leonard Phase II:        Anesthesia Post Evaluation    Patient location during evaluation: PACU  Patient participation: waiting for patient participation  Level of consciousness: awake  Pain score: 3  Airway patency: patent  Nausea & Vomiting: no nausea  Complications: no  Cardiovascular status: blood pressure returned to baseline  Respiratory status: acceptable  Hydration status: euvolemic

## 2023-04-19 NOTE — H&P
facility-administered medications for this visit. No Known Allergies     The patient has a family history that is negative for severe cardiovascular or respiratory issues, negative for reaction to anesthesia. Social History               Socioeconomic History    Marital status:        Spouse name: Not on file    Number of children: Not on file    Years of education: Not on file    Highest education level: Not on file   Occupational History    Not on file   Tobacco Use    Smoking status: Every Day       Packs/day: 1.00       Years: 16.00       Pack years: 16.00       Types: Cigarettes    Smokeless tobacco: Never   Vaping Use    Vaping Use: Never used   Substance and Sexual Activity    Alcohol use: Yes    Drug use: Yes       Frequency: 7.0 times per week       Types: Marijuana Easter Cirri)    Sexual activity: Yes       Partners: Male   Other Topics Concern    Not on file   Social History Narrative     Drinks 2L Coke & 3 cups of coffee daily.        Social Determinants of Health      Financial Resource Strain: Not on file   Food Insecurity: Not on file   Transportation Needs: Not on file   Physical Activity: Not on file   Stress: Not on file   Social Connections: Not on file   Intimate Partner Violence: Not on file   Housing Stability: Not on file                  Review of Systems  Review of Systems -  General ROS: negative for - chills, fatigue or malaise  ENT ROS: negative for - hearing change, nasal congestion or nasal discharge  Allergy and Immunology ROS: negative for - hives, itchy/watery eyes or nasal congestion  Hematological and Lymphatic ROS: negative for - blood clots, blood transfusions, bruising or fatigue  Endocrine ROS: negative for - malaise/lethargy, mood swings, palpitations or polydipsia/polyuria  Respiratory ROS: negative for - sputum changes, stridor, tachypnea or wheezing  Cardiovascular ROS: negative for - irregular heartbeat, loss of consciousness, murmur or

## 2023-04-19 NOTE — OP NOTE
DATE OF PROCEDURE: 4/19/2023    SURGEON: ADENIKE Fermin M.D. PREOPERATIVE DIAGNOSIS: rectal prolapse with constipation    POSTOPERATIVE DIAGNOSIS: same    OPERATION:   1.)Laparoscopic sigmoid colon resection with end-to-end stapled  Anastomosis. 2.)mobilization of splenic flexure  3.) rectopexy    ANESTHESIA: General.    ESTIMATED BLOOD LOSS: 100 mL. COMPLICATIONS: None. FLUIDS: Crystalloid. DISPOSITION: they will be admitted to the floor. SPECIMENS: Sigmoid colon and donuts from anastomosis. INDICATIONS: This is a 55-year-old female who presented in with the  aforementioned diagnosis. I explained the risks, benefits, potential  outcomes, and alternative treatment to the aforementioned procedure and they  agreed to proceed, understanding those risks and potential outcomes. DESCRIPTION OF PROCEDURE: The patient was brought in the operative suite,  was placed under general anesthesia, and was given preoperative antibiotics. they had bilateral PCDs placed. they was then placed in the lithotomy position  and prepped and draped. Once this was done, a 5-mm incision was made supraumbilically after the local  anesthetic was infiltrated into the abdominal wall. A Veress needle was  passed into the peritoneum. CO2 was then used to insufflate the abdomen with  pressure to 15 mmHg. At this time, 2 additional trocars were placed, one  5-mm trocar and one 10-mm trocar in the right lateral abdomen. We then used  these to dissect free the sigmoid colon from the left pelvic wall. It was  densely adhered there. The left colon was mobilized all the way up to and around the  splenic flexure and the entire rectum was mobilized down to the peritonea  reflection. An [place] 60 stapler was taken across the  midrectum above the peritoneal reflection and the mesorectum.  Once this was  completed, the mesocolon was taken up, dividing the RANDY with the LigaSure  device to an area past the area

## 2023-04-19 NOTE — CONSULTS
Lab Results   Component Value Date/Time    WBC 6.5 04/17/2023 09:37 AM    RBC 4.24 04/17/2023 09:37 AM    HGB 13.2 04/17/2023 09:37 AM    HCT 39.1 04/17/2023 09:37 AM    MCV 92.2 04/17/2023 09:37 AM    MCH 31.1 04/17/2023 09:37 AM    MCHC 33.8 04/17/2023 09:37 AM    RDW 12.2 04/17/2023 09:37 AM     04/17/2023 09:37 AM    MPV 10.6 04/17/2023 09:37 AM     BMP:    Lab Results   Component Value Date/Time     04/17/2023 09:37 AM    K 3.9 04/17/2023 09:37 AM     04/17/2023 09:37 AM    CO2 28 04/17/2023 09:37 AM    BUN 13 04/17/2023 09:37 AM    LABALBU 4.5 04/17/2023 09:37 AM    LABALBU 3.7 10/22/2011 12:35 AM    CREATININE 0.8 04/17/2023 09:37 AM    CALCIUM 8.6 04/17/2023 09:37 AM    GFRAA >60 03/10/2022 12:00 PM    LABGLOM >60 04/17/2023 09:37 AM    GLUCOSE 83 04/17/2023 09:37 AM    GLUCOSE 107 10/22/2011 12:35 AM       IMPRESSION/RECOMMENDATIONS:      Rectal prolapse with constipation: S/P lap sigmoid colon resection and rectopexy. Continue pain control. Anticoagulation per primary  Hypertension Essential: BP is soft now. Will monitor  Tobacco abuse: nicotine patch. COPD: not in exacerbation. Continue Trelegy.

## 2023-04-20 VITALS
DIASTOLIC BLOOD PRESSURE: 67 MMHG | SYSTOLIC BLOOD PRESSURE: 122 MMHG | HEIGHT: 62 IN | HEART RATE: 77 BPM | BODY MASS INDEX: 19.69 KG/M2 | RESPIRATION RATE: 18 BRPM | WEIGHT: 107 LBS | OXYGEN SATURATION: 97 % | TEMPERATURE: 99.7 F

## 2023-04-20 LAB
ALBUMIN SERPL-MCNC: 4.1 G/DL (ref 3.5–5.2)
ALP SERPL-CCNC: 69 U/L (ref 35–104)
ALT SERPL-CCNC: 10 U/L (ref 0–32)
ANION GAP SERPL CALCULATED.3IONS-SCNC: 12 MMOL/L (ref 7–16)
AST SERPL-CCNC: 17 U/L (ref 0–31)
BASOPHILS # BLD: 0 E9/L (ref 0–0.2)
BASOPHILS NFR BLD: 0.1 % (ref 0–2)
BILIRUB SERPL-MCNC: 0.5 MG/DL (ref 0–1.2)
BUN SERPL-MCNC: 9 MG/DL (ref 6–23)
CALCIUM SERPL-MCNC: 8.9 MG/DL (ref 8.6–10.2)
CHLORIDE SERPL-SCNC: 107 MMOL/L (ref 98–107)
CO2 SERPL-SCNC: 23 MMOL/L (ref 22–29)
CREAT SERPL-MCNC: 0.6 MG/DL (ref 0.5–1)
EOSINOPHIL # BLD: 0 E9/L (ref 0.05–0.5)
EOSINOPHIL NFR BLD: 0.1 % (ref 0–6)
ERYTHROCYTE [DISTWIDTH] IN BLOOD BY AUTOMATED COUNT: 11.9 FL (ref 11.5–15)
GLUCOSE SERPL-MCNC: 109 MG/DL (ref 74–99)
HCT VFR BLD AUTO: 31.9 % (ref 34–48)
HGB BLD-MCNC: 11 G/DL (ref 11.5–15.5)
LYMPHOCYTES # BLD: 0.6 E9/L (ref 1.5–4)
LYMPHOCYTES NFR BLD: 2.6 % (ref 20–42)
MAGNESIUM SERPL-MCNC: 1.9 MG/DL (ref 1.6–2.6)
MCH RBC QN AUTO: 31.4 PG (ref 26–35)
MCHC RBC AUTO-ENTMCNC: 34.5 % (ref 32–34.5)
MCV RBC AUTO: 91.1 FL (ref 80–99.9)
MONOCYTES # BLD: 0.6 E9/L (ref 0.1–0.95)
MONOCYTES NFR BLD: 2.6 % (ref 2–12)
NEUTROPHILS # BLD: 18.91 E9/L (ref 1.8–7.3)
NEUTS SEG NFR BLD: 94.8 % (ref 43–80)
PLATELET # BLD AUTO: 276 E9/L (ref 130–450)
PMV BLD AUTO: 10.6 FL (ref 7–12)
POTASSIUM SERPL-SCNC: 3.5 MMOL/L (ref 3.5–5)
PROT SERPL-MCNC: 6.7 G/DL (ref 6.4–8.3)
RBC # BLD AUTO: 3.5 E12/L (ref 3.5–5.5)
SODIUM SERPL-SCNC: 142 MMOL/L (ref 132–146)
WBC # BLD: 19.9 E9/L (ref 4.5–11.5)

## 2023-04-20 PROCEDURE — 85025 COMPLETE CBC W/AUTO DIFF WBC: CPT

## 2023-04-20 PROCEDURE — 99232 SBSQ HOSP IP/OBS MODERATE 35: CPT | Performed by: INTERNAL MEDICINE

## 2023-04-20 PROCEDURE — 80053 COMPREHEN METABOLIC PANEL: CPT

## 2023-04-20 PROCEDURE — 2500000003 HC RX 250 WO HCPCS: Performed by: SURGERY

## 2023-04-20 PROCEDURE — 83735 ASSAY OF MAGNESIUM: CPT

## 2023-04-20 PROCEDURE — 6360000002 HC RX W HCPCS: Performed by: SURGERY

## 2023-04-20 PROCEDURE — 36415 COLL VENOUS BLD VENIPUNCTURE: CPT

## 2023-04-20 RX ORDER — PROMETHAZINE HYDROCHLORIDE 25 MG/ML
12.5 INJECTION, SOLUTION INTRAMUSCULAR; INTRAVENOUS EVERY 6 HOURS PRN
Status: DISCONTINUED | OUTPATIENT
Start: 2023-04-20 | End: 2023-04-20 | Stop reason: HOSPADM

## 2023-04-20 RX ADMIN — METRONIDAZOLE 500 MG: 500 INJECTION, SOLUTION INTRAVENOUS at 00:29

## 2023-04-20 RX ADMIN — ONDANSETRON 4 MG: 2 INJECTION INTRAMUSCULAR; INTRAVENOUS at 04:33

## 2023-04-20 RX ADMIN — MORPHINE SULFATE 2 MG: 2 INJECTION, SOLUTION INTRAMUSCULAR; INTRAVENOUS at 04:33

## 2023-04-20 RX ADMIN — KETOROLAC TROMETHAMINE 15 MG: 15 INJECTION, SOLUTION INTRAMUSCULAR; INTRAVENOUS at 04:33

## 2023-04-20 RX ADMIN — KETOROLAC TROMETHAMINE 15 MG: 15 INJECTION, SOLUTION INTRAMUSCULAR; INTRAVENOUS at 00:29

## 2023-04-20 RX ADMIN — ENOXAPARIN SODIUM 30 MG: 100 INJECTION SUBCUTANEOUS at 10:39

## 2023-04-20 ASSESSMENT — PAIN SCALES - GENERAL
PAINLEVEL_OUTOF10: 2
PAINLEVEL_OUTOF10: 6
PAINLEVEL_OUTOF10: 0

## 2023-04-20 ASSESSMENT — PAIN DESCRIPTION - LOCATION: LOCATION: ABDOMEN

## 2023-04-20 ASSESSMENT — PAIN - FUNCTIONAL ASSESSMENT: PAIN_FUNCTIONAL_ASSESSMENT: ACTIVITIES ARE NOT PREVENTED

## 2023-04-20 ASSESSMENT — PAIN DESCRIPTION - DESCRIPTORS: DESCRIPTORS: GNAWING

## 2023-04-20 NOTE — CARE COORDINATION
House  Plan for transportation at discharge:      Financial    Payor: Faustina Singh Astonkin / Plan: Laura Brood / Product Type: *No Product type* /         Potential assistance Purchasing Medications: No  Meds-to-Beds request: Yes      [address] [id number] - [address]  [address] [facility] [address]  Phone: [phone] Fax: [phone]      Notes:    Factors facilitating achievement of predicted outcomes: Family support, Motivated, Cooperative, and Pleasant    Barriers to discharge: NONE    Additional Case Management Notes: 4/20/2023: SS Note/Discharge plan:  Met with pt for transition of care planning, pt a&o, pleasant,pt reports living with her  & to functioning independently prior to admission, pt plans to return home when medically discharged, pt says her granddaughter is currently staying with them and will help her if needed and her  will transport her home,no home care needs relayed at this time. Electronically signed by DEANGELO Shine on 4/20/2023 at 12:46 PM      The Plan for Transition of Care is related to the following treatment goals of Rectal prolapse [K62.3]  S/P colectomy [A71.61]    IF APPLICABLE: The Patient and/or patient representative Juancho Ureña and her family were provided with a choice of provider and agrees with the discharge plan. Freedom of choice list with basic dialogue that supports the patient's individualized plan of care/goals and shares the quality data associated with the providers was provided to:     Patient Representative Name:       The Patient and/or Patient Representative Agree with the Discharge Plan?       Sherri Hu [facility]  Case Management Department  Ph: [phone]

## 2023-04-20 NOTE — PROGRESS NOTES
Call to [facility] at Dr. Hema Ferraro office re: EKG. She will check with the dr and respond.
GENERAL SURGERY  DAILY PROGRESS NOTE  4/20/2023    CHIEF COMPLAINT:  Rectal prolapse s/p lap sigmoidectomy and rectopexy    SUBJECTIVE:  Patient with some pain overnight, doesn't want to take too much pain medication because it makes her loopy. No nausea or vomiting. No flatus or BM yet. OBJECTIVE:  /67   Pulse 77   Temp 99.7 °F (37.6 °C) (Oral)   Resp 18   Ht 5' 2\" (1.575 m)   Wt 107 lb (48.5 kg)   LMP 10/23/1990   SpO2 97%   BMI 19.57 kg/m²     GENERAL:  NAD. A&Ox3. LUNGS:  No increased work of breathing. CARDIOVASCULAR: RR  ABDOMEN:  Soft, non-distended, appropriately tender to palpation around incisions which are c/d/i. No guarding, rigidity, rebound. EXT: warm and well perfused     ASSESSMENT/PLAN:  79 y.o. female POD#1 from laparoscopic sigmoidectomy and rectopexy for rectal prolapse. - remove bucio  - OOB, ambulate  - maintain on FLD  - prn pain control  - prn anti-emetic     Discussed with Dr. Khushbu Pretty.      Yoandy Vora MD  Surgery Resident PGY-3  4/20/2023  2:15 PM
OK to proceed from PCP received over fax machine and placed on the chart.
Patient notified bedside nurse that she would like to leave AMA. This nurse notified on call Attending Surgeon about patient's wishes. Dr. Ev Troncoso stated that he would be able to discharge her after he was finished with his procedure. Patient updated. Patient did not want to wait for her official discharge and requested to leave AMA. Dr. Ev Troncoso notified.
range of motion noted  NEUROLOGIC:  Mental Status Exam:  Level of Alertness:   awake  Orientation:   person, place, time  SKIN:  no bruising or bleeding  Data    CBC:   Lab Results   Component Value Date/Time    WBC 19.9 04/20/2023 09:01 AM    RBC 3.50 04/20/2023 09:01 AM    HGB 11.0 04/20/2023 09:01 AM    HCT 31.9 04/20/2023 09:01 AM    MCV 91.1 04/20/2023 09:01 AM    MCH 31.4 04/20/2023 09:01 AM    MCHC 34.5 04/20/2023 09:01 AM    RDW 11.9 04/20/2023 09:01 AM     04/20/2023 09:01 AM    MPV 10.6 04/20/2023 09:01 AM     BMP:    Lab Results   Component Value Date/Time     04/17/2023 09:37 AM    K 3.9 04/17/2023 09:37 AM     04/17/2023 09:37 AM    CO2 28 04/17/2023 09:37 AM    BUN 13 04/17/2023 09:37 AM    LABALBU 4.5 04/17/2023 09:37 AM    LABALBU 3.7 10/22/2011 12:35 AM    CREATININE 0.8 04/17/2023 09:37 AM    CALCIUM 8.6 04/17/2023 09:37 AM    GFRAA >60 03/10/2022 12:00 PM    LABGLOM >60 04/17/2023 09:37 AM    GLUCOSE 83 04/17/2023 09:37 AM    GLUCOSE 107 10/22/2011 12:35 AM       ASSESSMENT AND PLAN        Rectal prolapse S/P colectomy and rectopexy: pain control. Diet per Gen surgery recommendation. Continue to monitor  Mild anemia: continue to monitor. Hgb is 11. Leukocytosis: suspects reactive, but will monitor closely  Intractable Nausea: added PRN Phenergan for nausea. Hx of Hypertension: BP is better now. Continue current management. Hold of on starting BP medication for now  COPD: not in exacerbation. Continue Breathing treatment. Tobacco Abuse: nicotine patch if needed.

## 2023-04-20 NOTE — PLAN OF CARE
Problem: Discharge Planning  Goal: Discharge to home or other facility with appropriate resources  4/19/2023 2252 by Rafia Elizabeth RN  Outcome: Progressing  4/19/2023 1252 by Keely Lara RN  Outcome: Progressing     Problem: Pain  Goal: Verbalizes/displays adequate comfort level or baseline comfort level  Outcome: Progressing     Problem: Safety - Adult  Goal: Free from fall injury  Outcome: Progressing     Problem: ABCDS Injury Assessment  Goal: Absence of physical injury  Outcome: Progressing

## 2023-04-20 NOTE — ACP (ADVANCE CARE PLANNING)
Advance Care Planning   Healthcare Decision Maker:    Primary Decision Maker: Jacobcira Sahu - [facility] - [phone]    Click here to complete Healthcare Decision Makers including selection of the Healthcare Decision Maker Relationship (ie \"Primary\"). Today we documented Decision Maker(s) consistent with Legal Next of Kin hierarchy.

## 2023-04-20 NOTE — DISCHARGE INSTRUCTIONS
Your information:  Name: Gia Billy  : 1955    Your instructions:    Discharge home AMA. Follow up with primary care provider and specialists as directed. Signs and symptoms to look out for:   Call 911 anytime you think you may need emergency care. For example, call if:    You passed out (lost consciousness). You are short of breath. Call your doctor now or seek immediate medical care if:    You have pain that does not get better after you take pain medicine. You cannot pass stool or gas. You are sick to your stomach and cannot keep fluids down. Bright red blood has soaked through your bandage. You have loose stitches, or your incision comes open. You have signs of a blood clot in your leg (called a deep vein thrombosis), such as:  Pain in your calf, back of the knee, thigh, or groin. Redness and swelling in your leg or groin. You have signs of infection, such as: Increased pain, swelling, warmth, or redness. Red streaks leading from the incision. Pus draining from the incision. A fever. Watch closely for any changes in your health, and be sure to contact your doctor if you have any problems. What to do after you leave the hospital:    Recommended diet:  full liquid    Recommended activity: no lifting, driving, or strenuous exercise until cleared by her physician    The following personal items were collected during your admission and were returned to you:    Belongings  Dental Appliances: Uppers  Vision - Corrective Lenses: Eyeglasses  Hearing Aid: None  Clothing: Footwear, Jacket/Coat, Pants, Shirt, Socks  Jewelry: None  Body Piercings Removed: N/A  Electronic Devices: Cell Phone  Weapons (Notify Protective Services/Security): None  Other Valuables:  At home  Home Medications: None  Valuables Given To: Family (Comment)  Provide Name(s) of Who Valuable(s) Were Given To:   Responsible person(s) in the waiting room:   Patient approves for

## 2023-05-04 ENCOUNTER — OFFICE VISIT (OUTPATIENT)
Dept: SURGERY | Age: 68
End: 2023-05-04

## 2023-05-04 VITALS
TEMPERATURE: 97.9 F | HEART RATE: 89 BPM | SYSTOLIC BLOOD PRESSURE: 117 MMHG | RESPIRATION RATE: 18 BRPM | DIASTOLIC BLOOD PRESSURE: 79 MMHG | OXYGEN SATURATION: 95 %

## 2023-05-04 DIAGNOSIS — K62.3 RECTAL PROLAPSE: Primary | ICD-10-CM

## 2023-05-04 PROCEDURE — 99024 POSTOP FOLLOW-UP VISIT: CPT | Performed by: SURGERY

## 2023-05-04 NOTE — PROGRESS NOTES
Surgery Progress Note            Chief complaint:   Patient Active Problem List   Diagnosis    Depression, major, recurrent ([facility]    Hypertension    Viral hepatitis B with hepatitis delta    Addiction, marijuana ([facility]    Bipolar 1 disorder ([facility]    Cirrhosis of liver due to hepatitis C    GERD (gastroesophageal reflux disease)    Substance abuse ([facility]    Chronic obstructive pulmonary disease (HCC)    Rectal prolapse    S/P colectomy       S: doing well    O:   Vitals:    05/04/23 1027   BP: 117/79   Pulse: 89   Resp: 18   Temp: 97.9 °F (36.6 °C)   SpO2: 95%     No intake or output data in the 24 hours ending 05/04/23 1032        Labs:  No results for input(s): WBC, HGB, HCT in the last 72 hours. Invalid input(s): PLR  Lab Results   Component Value Date    CREATININE 0.6 04/20/2023    BUN 9 04/20/2023     04/20/2023    K 3.5 04/20/2023     04/20/2023    CO2 23 04/20/2023     No results for input(s): LIPASE, AMYLASE in the last 72 hours. Physical exam:   /79 (Site: Left Upper Arm, Position: Sitting, Cuff Size: Medium Adult)   Pulse 89   Temp 97.9 °F (36.6 °C) (Infrared)   Resp 18   LMP 10/23/1990   SpO2 95%   General appearance: NAD  Head: NCAT  Neck: supple, no masses  Lungs: equal chest rise bilateral  Heart: S1S2 present  Abdomen: soft, nontender, nondistended  Skin; no new lesions, incisions clean and intact  Gu: no cva tenderness  Extremities: extremities normal, atraumatic, no cyanosis or edema    A:  Post op lap sigmoid with rectopexy for rectal prolapse with constipation    P: follow up as needed.      Wayne Mahajan MD, MD  5/4/2023

## 2025-03-06 NOTE — RESULT ENCOUNTER NOTE
Interval History: See hospital course for today      Review of Systems   Constitutional:  Positive for activity change (improving), appetite change (improving) and fatigue (improving).   HENT:  Positive for mouth sores and trouble swallowing.    Respiratory:  Negative for shortness of breath.    Gastrointestinal:  Positive for abdominal pain (improving) and nausea (improving). Negative for diarrhea and vomiting.        Loose stool   Allergic/Immunologic: Positive for immunocompromised state.   Neurological:  Positive for weakness.   Psychiatric/Behavioral:  Negative for agitation, behavioral problems, confusion, decreased concentration, dysphoric mood and sleep disturbance. The patient is not nervous/anxious.      Objective:     Vital Signs (Most Recent):  Temp: 99.3 °F (37.4 °C) (03/06/25 1208)  Pulse: 96 (03/06/25 1208)  Resp: 17 (03/06/25 1208)  BP: (!) 100/54 (03/06/25 1208)  SpO2: 96 % (03/06/25 1208) Vital Signs (24h Range):  Temp:  [98.1 °F (36.7 °C)-99.3 °F (37.4 °C)] 99.3 °F (37.4 °C)  Pulse:  [] 96  Resp:  [16-19] 17  SpO2:  [93 %-96 %] 96 %  BP: (100-115)/(51-56) 100/54     Weight: 67.6 kg (149 lb 0.5 oz)  Body mass index is 27.26 kg/m².    Intake/Output Summary (Last 24 hours) at 3/6/2025 1211  Last data filed at 3/6/2025 0822  Gross per 24 hour   Intake 675 ml   Output --   Net 675 ml         Physical Exam  Vitals and nursing note reviewed. Exam conducted with a chaperone present ().   Constitutional:       General: She is sleeping. She is not in acute distress.     Appearance: She is ill-appearing. She is not toxic-appearing.   HENT:      Head: Normocephalic and atraumatic.   Eyes:      General: Scleral icterus present.   Cardiovascular:      Rate and Rhythm: Normal rate.   Pulmonary:      Effort: Pulmonary effort is normal. No respiratory distress.   Abdominal:      Palpations: Abdomen is soft.      Tenderness: There is no abdominal tenderness.   Musculoskeletal:      Right lower leg:  Please call patient and let her know that labs from last visit returned normal. Thanks! Edema present.      Left lower leg: Edema present.   Skin:     General: Skin is warm.      Coloration: Skin is jaundiced.   Neurological:      Mental Status: She is easily aroused.      Motor: Weakness present.   Psychiatric:         Behavior: Behavior is cooperative.               Significant Labs: All pertinent labs within the past 24 hours have been reviewed.  Bilirubin:   Recent Labs   Lab 02/24/25  0938 02/25/25  0419 03/03/25  1248 03/03/25  1844 03/04/25  0527 03/05/25  0538 03/06/25  0537   BILIDIR >14.0*  --   --  >14.0*  --   --   --    BILITOT 22.4*   < > 24.1* 24.7* 22.7* 25.3* 23.3*    < > = values in this interval not displayed.     Blood Culture: negative growth to date x 3 days  CBC:   Recent Labs   Lab 03/05/25  0538 03/06/25  0537   WBC 1.60* 2.68*   HGB 11.8* 11.3*   HCT 33.3* 32.3*   * 125*     CMP:   Recent Labs   Lab 03/05/25  0538 03/06/25  0537    143   K 3.8 3.2*   * 119*   CO2 15* 16*   GLU 84 87   BUN 31* 31*   CREATININE 1.3 1.3   CALCIUM 7.2* 7.1*   PROT 4.8* 4.6*   ALBUMIN 1.7* 1.5*   BILITOT 25.3* 23.3*   ALKPHOS 603* 600*   * 415*   * 109*   ANIONGAP 9 8       Magnesium:   Recent Labs   Lab 03/05/25  0538 03/06/25  0537   MG 2.5 2.5     Cdiff negative  Peripheral smear   Pathologist Review    Status: Final result         Component Ref Range & Units 03/06/25 0537   Pathologist Review Peripheral Smear  REVIEWED   Comment:  Electronically reviewed and signed by:  Griffin Chaves M.D.  Signed on 03/06/25 at 11:01  Erythrocytes with microcytosis and numerous target cell forms  And  occasional nucleated forms.  Granulocytopenia with scant, immature  granulocytes with occasional pseudo-Pelger-Huet forms.  Lymphocytes  appear hernia with occasional mature-appearing lymphocytes.  reactive-appearing monocytes.  Negative for increased blasts.  Slight  decrease in morphologically normal platelets.   Resulting Agency  [facility]        Specimen Collected: 03/06/25 0537  Last Resulted: 03/06/25 1101 View Other Order Details          Significant Imaging: I have reviewed all pertinent imaging results/findings within the past 24 hours.

## (undated) DEVICE — SEALER TISS L45CM ADV BPLR STR TIP LAP APPRCH ENSEAL G2

## (undated) DEVICE — NDL CNTR 40CT FM MAG: Brand: MEDLINE INDUSTRIES, INC.

## (undated) DEVICE — COVER,TABLE,44X90,STERILE: Brand: MEDLINE

## (undated) DEVICE — SUTURE ABSRB L6IN L37MM 0 GS-21 GRN 1/2 CIR TAPR PNT NDL VLOCL0306

## (undated) DEVICE — DOUBLE BASIN SET: Brand: MEDLINE INDUSTRIES, INC.

## (undated) DEVICE — SOLUTION IRRIG 3000ML 0.9% SOD CHL USP UROMATIC PLAS CONT

## (undated) DEVICE — TROCAR: Brand: KII FIOS FIRST ENTRY

## (undated) DEVICE — STAPLER INT L28CM DIA29MM CLS STPL H10-2.5MM OPN LEG L5.5MM

## (undated) DEVICE — NEEDLE HYPO 25GA L1.5IN BLU POLYPR HUB S STL REG BVL STR

## (undated) DEVICE — COVER,LIGHT HANDLE,FLX,1/PK: Brand: MEDLINE INDUSTRIES, INC.

## (undated) DEVICE — APPLIER CLP M/L SHFT DIA5MM 15 LIG LIGAMAX 5

## (undated) DEVICE — PLUMEPORT ACTIV LAPAROSCOPIC SMOKE FILTRATION DEVICE: Brand: PLUMEPORT ACTIVE

## (undated) DEVICE — GARMENT,MEDLINE,DVT,INT,CALF,MED, GEN2: Brand: MEDLINE

## (undated) DEVICE — GENERATOR ELECSURG FORCETRAID

## (undated) DEVICE — TUBING, SUCTION, 1/4" X 10', STRAIGHT: Brand: MEDLINE

## (undated) DEVICE — GLOVE ORANGE PI 8   MSG9080

## (undated) DEVICE — SYRINGE IRRIG 60ML SFT PLIABLE BLB EZ TO GRP 1 HND USE W/

## (undated) DEVICE — APPLIER CLP L SHFT DIA12MM 20 ROT MULT LIGACLP

## (undated) DEVICE — TOWEL,OR,DSP,ST,BLUE,STD,6/PK,12PK/CS: Brand: MEDLINE

## (undated) DEVICE — STAPLER SKIN L440MM 32MM LNG 12 FIRING B FRM PWR + GRIPPING

## (undated) DEVICE — GLOVE ORANGE PI 7 1/2   MSG9075

## (undated) DEVICE — COLOSTOMY/ILEOSTOMY KIT,FLEXWEAR: Brand: NEW IMAGE

## (undated) DEVICE — KIT BEDSIDE REVITAL OX 500ML

## (undated) DEVICE — TROCAR: Brand: KII SLEEVE

## (undated) DEVICE — GOWN,SIRUS,NONRNF,SETINSLV,XL,20/CS: Brand: MEDLINE

## (undated) DEVICE — Device

## (undated) DEVICE — STAPLER INT L75MM CUT LN L73MM STPL LN L77MM BLU B FRM 8

## (undated) DEVICE — RELOAD STPL L75MM OPN H3.8MM CLS 1.5MM WIRE DIA0.2MM REG

## (undated) DEVICE — CLOTH SURG PREP PREOPERATIVE CHLORHEXIDINE GLUC 2% READYPREP

## (undated) DEVICE — 40586 ADVANCED TRENDELENBURG POSITIONING KIT: Brand: 40586 ADVANCED TRENDELENBURG POSITIONING KIT

## (undated) DEVICE — BLADE ES ELASTOMERIC COAT INSUL DURABLE BEND UPTO 90DEG

## (undated) DEVICE — MARKER,SKIN,WI/RULER AND LABELS: Brand: MEDLINE

## (undated) DEVICE — LAPAROSCOPIC SCISSORS: Brand: EPIX LAPAROSCOPIC SCISSORS

## (undated) DEVICE — PMI PTFE COATED LAPAROSCOPIC WIRE L-HOOK 44 CM: Brand: PMI

## (undated) DEVICE — HYDROPHILIC COATED RED RUBBER URETHRAL CATHETER, SMOOTH ROUNDED TIP, 20 FR (6.7 MM): Brand: DOVER

## (undated) DEVICE — AGENT HEMSTAT W2XL4IN OXIDIZED REGENERATED CELOS ABSRB

## (undated) DEVICE — SPONGE LAP W18XL18IN WHT COT 4 PLY FLD STRUNG RADPQ DISP ST 2 PER PACK

## (undated) DEVICE — TOTAL TRAY, 16FR 10ML SIL FOLEY, URN: Brand: MEDLINE

## (undated) DEVICE — SYRINGE MED 10ML TRNSLUC BRL PLUNG BLK MRK POLYPR CTRL

## (undated) DEVICE — PACK SURG LAP CHOLE CUSTOM

## (undated) DEVICE — PUMP SUC IRR TBNG L10FT W/ HNDPC ASSEMB STRYKEFLOW 2

## (undated) DEVICE — APPLICATOR MEDICATED 26 CC SOLUTION HI LT ORNG CHLORAPREP

## (undated) DEVICE — ELECTRODE PT RET AD L9FT HI MOIST COND ADH HYDRGEL CORDED

## (undated) DEVICE — RELOAD STPL L60MM H1.5-3.6MM REG TISS BLU GRIPPING SURF B

## (undated) DEVICE — INSUFFLATION NEEDLE TO ESTABLISH PNEUMOPERITONEUM.: Brand: INSUFFLATION NEEDLE

## (undated) DEVICE — YANKAUER,BULB TIP,W/O VENT,RIGID,STERILE: Brand: MEDLINE

## (undated) DEVICE — STAPLER EXT 65MM S STL AUTO DISP PURSTRING